# Patient Record
Sex: FEMALE | Race: WHITE | HISPANIC OR LATINO | URBAN - METROPOLITAN AREA
[De-identification: names, ages, dates, MRNs, and addresses within clinical notes are randomized per-mention and may not be internally consistent; named-entity substitution may affect disease eponyms.]

---

## 2019-04-03 ENCOUNTER — EMERGENCY (EMERGENCY)
Facility: HOSPITAL | Age: 29
LOS: 1 days | Discharge: HOME | End: 2019-04-03
Attending: EMERGENCY MEDICINE | Admitting: EMERGENCY MEDICINE
Payer: MEDICAID

## 2019-04-03 VITALS
DIASTOLIC BLOOD PRESSURE: 78 MMHG | SYSTOLIC BLOOD PRESSURE: 131 MMHG | TEMPERATURE: 98 F | OXYGEN SATURATION: 99 % | HEART RATE: 94 BPM | RESPIRATION RATE: 16 BRPM

## 2019-04-03 PROCEDURE — 99283 EMERGENCY DEPT VISIT LOW MDM: CPT

## 2019-04-03 PROCEDURE — 71046 X-RAY EXAM CHEST 2 VIEWS: CPT | Mod: 26

## 2019-04-03 RX ORDER — AZITHROMYCIN 500 MG/1
1 TABLET, FILM COATED ORAL
Qty: 6 | Refills: 0 | OUTPATIENT
Start: 2019-04-03 | End: 2019-04-07

## 2019-04-03 RX ADMIN — Medication 200 MILLIGRAM(S): at 12:11

## 2019-04-03 NOTE — ED PROVIDER NOTE - NSFOLLOWUPINSTRUCTIONS_ED_ALL_ED_FT
-- Follow up with your primary care physician in 48 hours.    -- Return to the ER for worsening or persistent symptoms, and/or ANY NEW OR CONCERNING SYMPTOMS.    -- If you have difficulty following up, please call: [phone] to obtain a [facility] doctor or specialist who takes your insurance in your area.

## 2019-04-03 NOTE — ED PROVIDER NOTE - NSFOLLOWUPCLINICS_GEN_ALL_ED_FT
[facility] General Internal Medicine  General Internal Medicine  [address] [id number]  Phone: [phone]  Fax:   Follow Up Time:

## 2019-04-03 NOTE — ED ADULT TRIAGE NOTE - CHIEF COMPLAINT QUOTE
pt complaining of cough x3 weeks and abdominal pain with diarrhea. Pt denies chest pain, SOB, fever or chills.

## 2019-04-03 NOTE — ED PROVIDER NOTE - OBJECTIVE STATEMENT
pt reports  cough for 2 weeks, subjective fevers, no SOB, no n/v/d, abdominal pain only when coughing.

## 2021-01-08 ENCOUNTER — EMERGENCY (EMERGENCY)
Facility: HOSPITAL | Age: 31
LOS: 1 days | Discharge: HOME | End: 2021-01-08
Attending: STUDENT IN AN ORGANIZED HEALTH CARE EDUCATION/TRAINING PROGRAM
Payer: MEDICAID

## 2021-01-08 VITALS
DIASTOLIC BLOOD PRESSURE: 80 MMHG | HEIGHT: 55 IN | RESPIRATION RATE: 18 BRPM | HEART RATE: 74 BPM | SYSTOLIC BLOOD PRESSURE: 114 MMHG | WEIGHT: 145.06 LBS | OXYGEN SATURATION: 99 % | TEMPERATURE: 98 F

## 2021-01-08 VITALS
DIASTOLIC BLOOD PRESSURE: 86 MMHG | HEART RATE: 78 BPM | TEMPERATURE: 99 F | SYSTOLIC BLOOD PRESSURE: 102 MMHG | OXYGEN SATURATION: 100 % | RESPIRATION RATE: 19 BRPM

## 2021-01-08 LAB
ALBUMIN SERPL ELPH-MCNC: 4.5 G/DL — SIGNIFICANT CHANGE UP (ref 3.3–5)
ALP SERPL-CCNC: 76 U/L — SIGNIFICANT CHANGE UP (ref 40–120)
ALT FLD-CCNC: 58 U/L — HIGH (ref 10–45)
ANION GAP SERPL CALC-SCNC: 12 MMOL/L — SIGNIFICANT CHANGE UP (ref 5–17)
AST SERPL-CCNC: 43 U/L — HIGH (ref 10–40)
BASOPHILS # BLD AUTO: 0.02 K/UL — SIGNIFICANT CHANGE UP (ref 0–0.2)
BASOPHILS NFR BLD AUTO: 0.3 % — SIGNIFICANT CHANGE UP (ref 0–2)
BILIRUB SERPL-MCNC: 0.2 MG/DL — SIGNIFICANT CHANGE UP (ref 0.2–1.2)
BLD GP AB SCN SERPL QL: NEGATIVE — SIGNIFICANT CHANGE UP
BUN SERPL-MCNC: 13 MG/DL — SIGNIFICANT CHANGE UP (ref 7–23)
CALCIUM SERPL-MCNC: 9.8 MG/DL — SIGNIFICANT CHANGE UP (ref 8.4–10.5)
CHLORIDE SERPL-SCNC: 103 MMOL/L — SIGNIFICANT CHANGE UP (ref 96–108)
CO2 SERPL-SCNC: 25 MMOL/L — SIGNIFICANT CHANGE UP (ref 22–31)
CREAT SERPL-MCNC: 0.77 MG/DL — SIGNIFICANT CHANGE UP (ref 0.5–1.3)
EOSINOPHIL # BLD AUTO: 0.07 K/UL — SIGNIFICANT CHANGE UP (ref 0–0.5)
EOSINOPHIL NFR BLD AUTO: 0.9 % — SIGNIFICANT CHANGE UP (ref 0–6)
GLUCOSE SERPL-MCNC: 100 MG/DL — HIGH (ref 70–99)
HCG SERPL-ACNC: 15 MIU/ML — HIGH
HCT VFR BLD CALC: 40.2 % — SIGNIFICANT CHANGE UP (ref 34.5–45)
HGB BLD-MCNC: 12.8 G/DL — SIGNIFICANT CHANGE UP (ref 11.5–15.5)
IMM GRANULOCYTES NFR BLD AUTO: 0.4 % — SIGNIFICANT CHANGE UP (ref 0–1.5)
LYMPHOCYTES # BLD AUTO: 2.08 K/UL — SIGNIFICANT CHANGE UP (ref 1–3.3)
LYMPHOCYTES # BLD AUTO: 26.6 % — SIGNIFICANT CHANGE UP (ref 13–44)
MCHC RBC-ENTMCNC: 28.3 PG — SIGNIFICANT CHANGE UP (ref 27–34)
MCHC RBC-ENTMCNC: 31.8 GM/DL — LOW (ref 32–36)
MCV RBC AUTO: 88.9 FL — SIGNIFICANT CHANGE UP (ref 80–100)
MONOCYTES # BLD AUTO: 0.54 K/UL — SIGNIFICANT CHANGE UP (ref 0–0.9)
MONOCYTES NFR BLD AUTO: 6.9 % — SIGNIFICANT CHANGE UP (ref 2–14)
NEUTROPHILS # BLD AUTO: 5.09 K/UL — SIGNIFICANT CHANGE UP (ref 1.8–7.4)
NEUTROPHILS NFR BLD AUTO: 64.9 % — SIGNIFICANT CHANGE UP (ref 43–77)
NRBC # BLD: 0 /100 WBCS — SIGNIFICANT CHANGE UP (ref 0–0)
PLATELET # BLD AUTO: 333 K/UL — SIGNIFICANT CHANGE UP (ref 150–400)
POTASSIUM SERPL-MCNC: 3.5 MMOL/L — SIGNIFICANT CHANGE UP (ref 3.5–5.3)
POTASSIUM SERPL-SCNC: 3.5 MMOL/L — SIGNIFICANT CHANGE UP (ref 3.5–5.3)
PROT SERPL-MCNC: 7.8 G/DL — SIGNIFICANT CHANGE UP (ref 6–8.3)
RBC # BLD: 4.52 M/UL — SIGNIFICANT CHANGE UP (ref 3.8–5.2)
RBC # FLD: 13.4 % — SIGNIFICANT CHANGE UP (ref 10.3–14.5)
RH IG SCN BLD-IMP: POSITIVE — SIGNIFICANT CHANGE UP
SODIUM SERPL-SCNC: 140 MMOL/L — SIGNIFICANT CHANGE UP (ref 135–145)
WBC # BLD: 7.83 K/UL — SIGNIFICANT CHANGE UP (ref 3.8–10.5)
WBC # FLD AUTO: 7.83 K/UL — SIGNIFICANT CHANGE UP (ref 3.8–10.5)

## 2021-01-08 PROCEDURE — 76817 TRANSVAGINAL US OBSTETRIC: CPT | Mod: 26

## 2021-01-08 PROCEDURE — 93975 VASCULAR STUDY: CPT | Mod: 26

## 2021-01-08 PROCEDURE — 99285 EMERGENCY DEPT VISIT HI MDM: CPT

## 2021-01-08 PROCEDURE — 99283 EMERGENCY DEPT VISIT LOW MDM: CPT

## 2021-01-08 NOTE — ED PROVIDER NOTE - PROGRESS NOTE DETAILS
Noman Sandoval PGY3  TVUS shows echogenic structure in adnexa, obgyn consulted, state hcg has actually been decreasing since at OSH, likely passed pregnancy, rec f/u in clinic in one week. clinically stable for dc

## 2021-01-08 NOTE — ED PROVIDER NOTE - CLINICAL SUMMARY MEDICAL DECISION MAKING FREE TEXT BOX
31 y/o female with no pmhx presenting with recent positive pregnancy test, vaginal bleeding, and abdominal pain. Will repeat Hcg and transvaginal u/s for low suspicion of threatened  vs. ectopic pregnancy. CBC to monitor H/H and type/screen if patient requires transfusion. UA for urinary symptoms

## 2021-01-08 NOTE — ED ADULT NURSE NOTE - OBJECTIVE STATEMENT
30yr old female  presents to ED c/o vaginal bleeding. Pt states she was seen at [facility] yesterday for abd pain and vaginal bleeding, she was told she was pregnant, discharged and told to f/u for rpt blood work. Pt states the abd pain and vaginal bleeding is not subsiding and she states she is going through 2 pads a day, and she is experiencing abd cramping in the MID/LLQ. Pts LMP was , pt denies CP, SOB, GI,  symptoms, fevers, chills, syncope, or weakness. Pt assessed by MD bed lowered and locked, call bell within reach. will reassess

## 2021-01-08 NOTE — ED PROVIDER NOTE - NS ED ROS FT
Gen: Denies fever, weight loss  CV: Denies chest pain, palpitations  Skin: Denies rash, erythema, color changes  Resp: Denies SOB, cough  Endo: Denies sensitivity to heat, cold, increased urination  GI: Denies diarrhea, constipation  Msk: Denies back pain, LE swelling, extremity pain  : Denies increased frequency  Neuro: Denies LOC, weakness, numbness/tingling

## 2021-01-08 NOTE — ED PROVIDER NOTE - NSFOLLOWUPINSTRUCTIONS_ED_ALL_ED_FT
Activities as tolerated. Please encourage good oral and fluid intake. For pain, please take Motrin 400mg every 4 hours as needed, or Tylenol 650mg every 6 hours as needed.    Please see your primary care doctor within 24-48 hours for further management of your symptoms.  Please follow up with obgyn [phone] in one week for further evaluation of your symptoms.    Please seek emergent medical management if you have any worsening signs or symptoms, such as worsening vaginal bleeding (>2 pads per hour), loss of consciousness.

## 2021-01-08 NOTE — ED ADULT NURSE NOTE - CHIEF COMPLAINT QUOTE
Pt c/o abdominal pain and vaginal bleeding for 6 days LMP 11/18,2020; was in [facility] twice for the same problem

## 2021-01-08 NOTE — CONSULT NOTE ADULT - ATTENDING COMMENTS
ATTG note    Pt seen with and agree with PGY2 note    29 yo P2 LMP 11/18 presents to [facility] ED after presenting to [place] ED twice this week for vaginal bleeding and lower abd pain.  Pt reports that pain was increasing today.  Pt reports that pain and bleeding started 6 days ago.  Seen at [facility] ED on 1/5 HCG was 114, then rpt HCG on 1/7 was 29.7.  Pt also had u.s there that showed no IUP.      VSS, afebrile  Gen-NAD  ABd-vertical midline scar, soft, nd, nt, no rebound, no guarding  -as above    Labs -   Hct-40.2  HCG-15  MBT-rh+  TVS-no IUP, thin EML, no conclusive adnexal masses, no free fluid    a/p-29 yo P2 presents to [facility] for mngt of vaginal bleeding and pain in first trimester.  HCG at outside hospital was trending down.  Today HCG is still trending down one day later.  No active bleeding on exam,.  VSS.  No acute abdomen.  No conclusive signs of adnexal masses on u/s.  With down trending HCG more than likely missed AB.  No further acute GYN intervention needed at this time but recommend to cont to trend HCG to negative.  -dc fro GYN standpoint  -f/u in 1 week for rpt HCG  -ED precautions - heavy bleeding or worsening pain    VAUGHN White

## 2021-01-08 NOTE — ED PROVIDER NOTE - GENITOURINARY SPECULUM EXAM
Gross blood in vaginal vault with no fissures/llacertions seen; unable to visualize cervical os due to blood in vaginal vault. No adnexal/cervical motion tenderness on bimanual exam

## 2021-01-08 NOTE — ED PROVIDER NOTE - PHYSICAL EXAMINATION
Gen: WDWN, NAD  HEENT: EOMI, no nasal discharge, mucous membranes moist  CV: RRR, +S1/S2, no M/R/G  Resp: CTAB, no W/R/R  GI: Abdomen soft non-distended, Mild TTP in suprapubic region with no rebound/guarding, no masses  MSK: No open wounds, no bruising, no LE edema, no calf tenderness   Neuro: CN2-12 grossly intact, A&Ox4, MS +5/5 in UE and LE BL, gross sensation intact in UE and LE BL  Psych: appropriate mood

## 2021-01-08 NOTE — ED PROVIDER NOTE - ATTENDING CONTRIBUTION TO CARE
30 F  (2 prior c section) LMP was , reports that she was told she was pregnant, and the first time she was at [facility] yesterday and was told that she should return to the hospital for repeat blood work,   pt is going through 2 ppd, no weakness, no syncope intermittent headache, that comes and goes, no associated vision changes, takes tylenol which helps w/ symptoms,   intermittent nausea/vomiting, no blood in the emesis.   on exam, pt is nontoxic appearing is speaking in full sentances,     this is her 30 F  (2 prior c section) LMP was , reports that she was told she was pregnant, and the first time she was at [facility] yesterday and was told that she should return to the hospital for repeat blood work,   pt is going through 2 ppd, no weakness, no syncope intermittent headache, that comes and goes, no associated vision changes, takes tylenol which helps w/ symptoms,   intermittent nausea/vomiting, no blood in the emesis.   on exam, pt is nontoxic appearing is speaking in full sentances,   abd soft, nontender, pelvic exam w/ mild blood no active pooling, no poc visualized, os closed  ddx threatened miscarriage, complete miscarriage, ectopic, pt w/ tvus w/ ?lesion in the RLQ, will have outpt follow up w/ obgyn was seen by gyn in er states ok to follow up outpt. pt is normotensive, nontoxic appearing , ambulatory no worsening symptoms.

## 2021-01-08 NOTE — ED PROVIDER NOTE - OBJECTIVE STATEMENT
29 y/o female with no pmhx presenting with recent positive pregnancy test, vaginal bleeding, and abdominal pain. Patient seen at [facility] on Tuesday and Wednesday with positive pregnancy test and u/s performed but patient does not know results. Patient's LMP on 11/18/20. Patient has had persistent vaginal bleeding since Sunday, using 2 pads per day. Abdominal pain is localized to b/l lower quadrants with intermittent emesis. Patient also reports burning with urination but denies fevers/chills, cough, diarrhea, or rashes. 31 y/o female with no pmhx presenting with recent positive pregnancy test, vaginal bleeding, and abdominal pain. Patient seen at [facility] on Tuesday and Wednesday with positive pregnancy test and u/s performed but patient does not know results. Patient's LMP on 20. Patient has had persistent vaginal bleeding since , using 2 pads per day. Abdominal pain is localized to b/l lower quadrants with intermittent emesis. Patient also reports burning with urination but denies fevers/chills, cough, diarrhea, or rashes. Patient has history of 2 full term  pregnancies with no complications and denies any hx of /miscarriages. Patient denies any weakness, syncope/LOC, lightheadedness, weakness, or numbness/tingling.

## 2021-01-08 NOTE — CONSULT NOTE ADULT - ASSESSMENT
29yo  LMP  with HCG 15 and no IUP or adnexal mass concerning for ectopic pregnancy on TVUS, thin endometrial stripe.  HCG is downtrending from values at [facility] earlier this week.  Minimal tenderness on abdominal exam and light bleed from cervical os.  Like miscarriage.    Recs:  -discharge patient with recommendation for 1wk follow up either at [facility], ED or Clinic  -patient may call clinic at [phone] on Monday to set appointment for next week  -return precautions provided to patient  -no acute OB/GYN intervention at this time    Pt seen and examined w/ Dr. Christopher Villegas, PGY2 29yo  LMP  with HCG 15 and no IUP or adnexal mass concerning for ectopic pregnancy on TVUS, thin endometrial stripe.  HCG is downtrending from values at [facility] earlier this week consistent with missed .  Minimal tenderness on abdominal exam and light bleed from cervical os.  Patient hemodynamically stable.    Recs:  -discharge patient with recommendation for 1wk follow HCG up either at [facility], ED or Clinic  -patient may call clinic at [phone] on Monday to set appointment for next week  -return precautions provided to patient  -no acute OB/GYN intervention at this time    Pt seen and examined w/ Dr. Christopher Villegas, PGY2

## 2021-01-08 NOTE — CONSULT NOTE ADULT - SUBJECTIVE AND OBJECTIVE BOX
Patient seen at bedside  Consult Note to follow GYN Consult Note    30y  LMP  w/ bleeding and abdominal cramping since .  Patient states she was seen in [facility] ED on Tuesday and Thursday this week forsymptoms but     : 114  : 29.7  HPI:      OB/GYN HISTORY:   G1:   G2:     Last Menstrual Period    Name of GYN Physician:  Date of Last Pap:  History of Abnormal Pap:  Date of Last Mammogram:  Date of Last Colonoscopy:  Current OCP use:     PAST MEDICAL & SURGICAL HISTORY:      REVIEW OF SYSTEMS  General: denies fevers, chills, tiredness  Skin/Breast: denies breast pain  Respiratory and Thorax: denies shortness of breath, denies cough  Cardiovascular: denies chest pain and denies palpitations  Gastrointestinal: denies abdominal pain, nausea/ vomiting	  Genitourinary: denies dysuria, increased urinary frequency, urgency	  Constitutional, Cardiovascular, Respiratory, Gastrointestinal, Genitourinary, Musculoskeletal and Integumentary review of systems are normal except as noted. 	    MEDICATIONS  (STANDING):    MEDICATIONS  (PRN):      Allergies    No Known Allergies    Intolerances        SOCIAL HISTORY:    FAMILY HISTORY:      Vital Signs Last 24 Hrs  T(C): 37.3 (2021 23:54), Max: 37.4 (2021 20:15)  T(F): 99.2 (2021 23:54), Max: 99.3 (2021 20:15)  HR: 78 (2021 23:54) (74 - 79)  BP: 102/86 (2021 23:54) (102/86 - 125/92)  BP(mean): --  RR: 19 (2021 23:54) (18 - 19)  SpO2: 100% (2021 23:54) (98% - 100%)    PHYSICAL EXAM:   Gen: NAD, alert and oriented x 3  Cardiovascular: regular   Respiratory: breathing comfortably on RA  Abd: soft, non tender, non-distended  Pelvic: closed/long, no CMT, Uterus: normal size, non tender  Adnexa: non tender, no palpable masses  Extremities: NTBL  Skin: warm and well perfused      LABS:                        12.8   7.83  )-----------( 333      ( 2021 20:42 )             40.2     01-08    140  |  103  |  13  ----------------------------<  100<H>  3.5   |  25  |  0.77    Ca    9.8      2021 20:42    TPro  7.8  /  Alb  4.5  /  TBili  0.2  /  DBili  x   /  AST  43<H>  /  ALT  58<H>  /  AlkPhos  76  -          RADIOLOGY & ADDITIONAL STUDIES: INCOMPLETE NOTE  GYN Consult Note    30y  LMP  w/ bleeding and abdominal cramping since .  Patient states she was seen in [facility] ED on Tuesday and Thursday this week for symptoms but     : 114  : 29.7  HPI:      OB/GYN HISTORY:   G1:   G2:     Last Menstrual Period    Name of GYN Physician:  Date of Last Pap:  History of Abnormal Pap:  Date of Last Mammogram:  Date of Last Colonoscopy:  Current OCP use:     PAST MEDICAL & SURGICAL HISTORY:      REVIEW OF SYSTEMS  General: denies fevers, chills, tiredness  Skin/Breast: denies breast pain  Respiratory and Thorax: denies shortness of breath, denies cough  Cardiovascular: denies chest pain and denies palpitations  Gastrointestinal: denies abdominal pain, nausea/ vomiting	  Genitourinary: denies dysuria, increased urinary frequency, urgency	  Constitutional, Cardiovascular, Respiratory, Gastrointestinal, Genitourinary, Musculoskeletal and Integumentary review of systems are normal except as noted. 	    MEDICATIONS  (STANDING):    MEDICATIONS  (PRN):      Allergies    No Known Allergies    Intolerances        SOCIAL HISTORY:    FAMILY HISTORY:      Vital Signs Last 24 Hrs  T(C): 37.3 (2021 23:54), Max: 37.4 (2021 20:15)  T(F): 99.2 (2021 23:54), Max: 99.3 (2021 20:15)  HR: 78 (2021 23:54) (74 - 79)  BP: 102/86 (2021 23:54) (102/86 - 125/92)  BP(mean): --  RR: 19 (2021 23:54) (18 - 19)  SpO2: 100% (2021 23:54) (98% - 100%)    PHYSICAL EXAM:   Gen: NAD, alert and oriented x 3  Cardiovascular: regular   Respiratory: breathing comfortably on RA  Abd: soft, non tender, non-distended  Pelvic: closed/long, no CMT, minimal bleeding from cervical OS Uterus: normal size, non tender  Adnexa: non tender, no palpable masses  Extremities: NTBL  Skin: warm and well perfused      LABS:                        12.8   7.83  )-----------( 333      ( 2021 20:42 )             40.2     01-08    140  |  103  |  13  ----------------------------<  100<H>  3.5   |  25  |  0.77    Ca    9.8      2021 20:42    TPro  7.8  /  Alb  4.5  /  TBili  0.2  /  DBili  x   /  AST  43<H>  /  ALT  58<H>  /  AlkPhos  76            RADIOLOGY & ADDITIONAL STUDIES: GYN Consult Note    30y  @7w3d by LMP  w/ bleeding and abdominal cramping since .  Patient states she was seen in [facility] ED on Tuesday and Thursday this week for symptoms but was unhappy with care she received.  She states the bleeding has slowed since , now only 2 pads a day.  Cramping is significantly better and managed with Tylenol  Denies nausea, vomiting, fevers, chills, SOB, headache.  This is a desired pregnancy test.  Upon examination we called [facility] and were given following HCG values:  114 () -> 29.7 ()      OB/GYN HISTORY:   G1: pLTCS  transverse lie  G2: rLTCS          PAST MEDICAL & SURGICAL HISTORY:   section x2    REVIEW OF SYSTEMS  General: denies fevers, chills, tiredness  Skin/Breast: denies breast pain  Respiratory and Thorax: denies shortness of breath, denies cough  Cardiovascular: denies chest pain and denies palpitations  Gastrointestinal: denies nausea/ vomiting	  Genitourinary: denies dysuria, increased urinary frequency, urgency	  Constitutional, Cardiovascular, Respiratory, Gastrointestinal, Genitourinary, Musculoskeletal and Integumentary review of systems are normal except as noted. 	    MEDICATIONS  (STANDING):      Allergies  No Known Allergies        Vital Signs Last 24 Hrs  T(C): 37.3 (2021 23:54), Max: 37.4 (2021 20:15)  T(F): 99.2 (2021 23:54), Max: 99.3 (2021 20:15)  HR: 78 (2021 23:54) (74 - 79)  BP: 102/86 (2021 23:54) (102/86 - 125/92)  BP(mean): --  RR: 19 (2021 23:54) (18 - 19)  SpO2: 100% (2021 23:54) (98% - 100%)    PHYSICAL EXAM:   Gen: NAD, alert and oriented x 3  Cardiovascular: regular   Respiratory: breathing comfortably on RA  Abd: soft, non tender, non-distended  Pelvic: closed/long, no CMT, minimal bleeding from cervical OS Uterus: normal size, non tender  Adnexa: non tender, no palpable masses  Extremities: NTBL  Skin: warm and well perfused      LABS:                        12.8   7.83  )-----------( 333      ( 2021 20:42 )             40.2         140  |  103  |  13  ----------------------------<  100<H>  3.5   |  25  |  0.77    Ca    9.8      2021 20:42    TPro  7.8  /  Alb  4.5  /  TBili  0.2  /  DBili  x   /  AST  43<H>  /  ALT  58<H>  /  AlkPhos  76        hHCG Quantitative, Serum (21 @ 20:42)   HCG Quantitative, Serum: 15.0    RADIOLOGY & ADDITIONAL STUDIES:  < from: US Doppler Pelvis (21 @ 21:51) >    EXAM:  US OB TRANSVAGINAL                          EXAM:  US DPLX PELVIC                            PROCEDURE DATE:  2021            INTERPRETATION:  CLINICAL INFORMATION: Positive pregnancy test at outside hospital. Vaginal bleeding for 6 days. Evaluate for ectopic pregnancy.    LMP: 2020  Beta-hCG: 15.0    Estimated Gestational Age by LMP: 7 weeks 2 days.    COMPARISON: None available.    TECHNIQUE: Endovaginal pelvic sonogram only. Color and Spectral Doppler was performed.    FINDINGS:    Uterus: No intrauterine gestation.  Endometrium: 10 mm.    Right ovary: 2.3 x 1.2 x 1.4 cm. Normal arterial and venous waveforms.    In the region of the right adnexa, there is a echogenic structure measuring 1.1 x 0.8 x 1.0 cm that appears separate from the ovary. Complete evaluation was limited secondary to overlying bowel gas.    Left ovary: 2.6 x 1.6 x 1.7 cm. Within normal limits. Normal arterial and venous waveforms.    Fluid: None.    IMPRESSION:    No intrauterine pregnancy. Questionable complex right adnexal structure, not well evaluated due to to extensive overlying bowel gas. Patient's last name beta-hCG is 15, indeterminate for pregnancy.    Recommend trending serial beta-hCG with repeat ultrasound in one week if patient remains hemodynamically stable.      < end of copied text >

## 2021-01-08 NOTE — ED PROVIDER NOTE - PATIENT PORTAL LINK FT
You can access the FollowMyHealth Patient Portal offered by [facility] by registering at the following website: [email or web address]. By joining [facility]’s FollowMyHealth portal, you will also be able to view your health information using other applications (apps) compatible with our system.

## 2021-01-09 LAB
APPEARANCE UR: CLEAR — SIGNIFICANT CHANGE UP
BACTERIA # UR AUTO: NEGATIVE — SIGNIFICANT CHANGE UP
BILIRUB UR-MCNC: NEGATIVE — SIGNIFICANT CHANGE UP
COLOR SPEC: SIGNIFICANT CHANGE UP
DIFF PNL FLD: ABNORMAL
EPI CELLS # UR: 6 /HPF — HIGH
GLUCOSE UR QL: NEGATIVE — SIGNIFICANT CHANGE UP
HYALINE CASTS # UR AUTO: 1 /LPF — SIGNIFICANT CHANGE UP (ref 0–2)
KETONES UR-MCNC: NEGATIVE — SIGNIFICANT CHANGE UP
LEUKOCYTE ESTERASE UR-ACNC: NEGATIVE — SIGNIFICANT CHANGE UP
NITRITE UR-MCNC: NEGATIVE — SIGNIFICANT CHANGE UP
PH UR: 6.5 — SIGNIFICANT CHANGE UP (ref 5–8)
PROT UR-MCNC: ABNORMAL
RBC CASTS # UR COMP ASSIST: 166 /HPF — HIGH (ref 0–4)
SP GR SPEC: 1.02 — SIGNIFICANT CHANGE UP (ref 1.01–1.02)
UROBILINOGEN FLD QL: NEGATIVE — SIGNIFICANT CHANGE UP
WBC UR QL: 5 /HPF — SIGNIFICANT CHANGE UP (ref 0–5)

## 2021-01-09 PROCEDURE — 93975 VASCULAR STUDY: CPT

## 2021-01-09 PROCEDURE — 86900 BLOOD TYPING SEROLOGIC ABO: CPT

## 2021-01-09 PROCEDURE — 84702 CHORIONIC GONADOTROPIN TEST: CPT

## 2021-01-09 PROCEDURE — 86901 BLOOD TYPING SEROLOGIC RH(D): CPT

## 2021-01-09 PROCEDURE — 76817 TRANSVAGINAL US OBSTETRIC: CPT

## 2021-01-09 PROCEDURE — 81001 URINALYSIS AUTO W/SCOPE: CPT

## 2021-01-09 PROCEDURE — 85025 COMPLETE CBC W/AUTO DIFF WBC: CPT

## 2021-01-09 PROCEDURE — 86850 RBC ANTIBODY SCREEN: CPT

## 2021-01-09 PROCEDURE — 99284 EMERGENCY DEPT VISIT MOD MDM: CPT

## 2021-01-09 PROCEDURE — 80053 COMPREHEN METABOLIC PANEL: CPT

## 2021-01-14 PROBLEM — Z78.9 OTHER SPECIFIED HEALTH STATUS: Chronic | Status: ACTIVE | Noted: 2019-04-03

## 2021-01-21 NOTE — CHART NOTE - NSCHARTNOTEFT_GEN_A_CORE
Pt called to remind to come to ED for repeat hcg level- no answer.    Diana Teague PGY2
Patient seen in [facility] ED on 1/8 with PUL. Called patient to remind her to come to [facility] ED for follow up HCG level. Patient verbalizes understanding, will come to ED tomorrow.     Margarita Burnett MD PGY1  d/w Dr. Alejandro
Patient unable to be contacted via telephone on multiple attempte. Certified letter sent (#[phone]) with information for OBGYN clinic to make appointment to follow up for repeat beta hcg testing. Will follow up and confirm receipt of letter.     Margarita Burnett MD PGY1

## 2021-04-08 ENCOUNTER — EMERGENCY (EMERGENCY)
Facility: HOSPITAL | Age: 31
LOS: 1 days | Discharge: HOME | End: 2021-04-08
Attending: EMERGENCY MEDICINE
Payer: MEDICAID

## 2021-04-08 VITALS
TEMPERATURE: 98 F | SYSTOLIC BLOOD PRESSURE: 110 MMHG | WEIGHT: 145.06 LBS | RESPIRATION RATE: 18 BRPM | HEART RATE: 81 BPM | OXYGEN SATURATION: 97 % | HEIGHT: 55 IN | DIASTOLIC BLOOD PRESSURE: 76 MMHG

## 2021-04-08 VITALS
DIASTOLIC BLOOD PRESSURE: 74 MMHG | HEART RATE: 70 BPM | OXYGEN SATURATION: 100 % | SYSTOLIC BLOOD PRESSURE: 111 MMHG | TEMPERATURE: 99 F | RESPIRATION RATE: 17 BRPM

## 2021-04-08 LAB
ALBUMIN SERPL ELPH-MCNC: 4.2 G/DL — SIGNIFICANT CHANGE UP (ref 3.3–5)
ALP SERPL-CCNC: 73 U/L — SIGNIFICANT CHANGE UP (ref 40–120)
ALT FLD-CCNC: 57 U/L — HIGH (ref 10–45)
ANION GAP SERPL CALC-SCNC: 12 MMOL/L — SIGNIFICANT CHANGE UP (ref 5–17)
APPEARANCE UR: ABNORMAL
AST SERPL-CCNC: 38 U/L — SIGNIFICANT CHANGE UP (ref 10–40)
BACTERIA # UR AUTO: ABNORMAL
BASOPHILS # BLD AUTO: 0.03 K/UL — SIGNIFICANT CHANGE UP (ref 0–0.2)
BASOPHILS NFR BLD AUTO: 0.5 % — SIGNIFICANT CHANGE UP (ref 0–2)
BILIRUB SERPL-MCNC: 0.4 MG/DL — SIGNIFICANT CHANGE UP (ref 0.2–1.2)
BILIRUB UR-MCNC: NEGATIVE — SIGNIFICANT CHANGE UP
BLD GP AB SCN SERPL QL: NEGATIVE — SIGNIFICANT CHANGE UP
BUN SERPL-MCNC: 12 MG/DL — SIGNIFICANT CHANGE UP (ref 7–23)
CALCIUM SERPL-MCNC: 9.3 MG/DL — SIGNIFICANT CHANGE UP (ref 8.4–10.5)
CHLORIDE SERPL-SCNC: 104 MMOL/L — SIGNIFICANT CHANGE UP (ref 96–108)
CO2 SERPL-SCNC: 23 MMOL/L — SIGNIFICANT CHANGE UP (ref 22–31)
COLOR SPEC: YELLOW — SIGNIFICANT CHANGE UP
CREAT SERPL-MCNC: 0.64 MG/DL — SIGNIFICANT CHANGE UP (ref 0.5–1.3)
DIFF PNL FLD: ABNORMAL
EOSINOPHIL # BLD AUTO: 0.09 K/UL — SIGNIFICANT CHANGE UP (ref 0–0.5)
EOSINOPHIL NFR BLD AUTO: 1.4 % — SIGNIFICANT CHANGE UP (ref 0–6)
EPI CELLS # UR: 5 /HPF — SIGNIFICANT CHANGE UP
GLUCOSE SERPL-MCNC: 89 MG/DL — SIGNIFICANT CHANGE UP (ref 70–99)
GLUCOSE UR QL: NEGATIVE — SIGNIFICANT CHANGE UP
HCG SERPL-ACNC: 21 MIU/ML — HIGH
HCT VFR BLD CALC: 40.9 % — SIGNIFICANT CHANGE UP (ref 34.5–45)
HGB BLD-MCNC: 12.9 G/DL — SIGNIFICANT CHANGE UP (ref 11.5–15.5)
HYALINE CASTS # UR AUTO: 0 /LPF — SIGNIFICANT CHANGE UP (ref 0–7)
IMM GRANULOCYTES NFR BLD AUTO: 0.3 % — SIGNIFICANT CHANGE UP (ref 0–1.5)
INR BLD: 1.12 RATIO — SIGNIFICANT CHANGE UP (ref 0.88–1.16)
KETONES UR-MCNC: NEGATIVE — SIGNIFICANT CHANGE UP
LEUKOCYTE ESTERASE UR-ACNC: ABNORMAL
LYMPHOCYTES # BLD AUTO: 1.92 K/UL — SIGNIFICANT CHANGE UP (ref 1–3.3)
LYMPHOCYTES # BLD AUTO: 29 % — SIGNIFICANT CHANGE UP (ref 13–44)
MCHC RBC-ENTMCNC: 28 PG — SIGNIFICANT CHANGE UP (ref 27–34)
MCHC RBC-ENTMCNC: 31.5 GM/DL — LOW (ref 32–36)
MCV RBC AUTO: 88.9 FL — SIGNIFICANT CHANGE UP (ref 80–100)
MONOCYTES # BLD AUTO: 0.56 K/UL — SIGNIFICANT CHANGE UP (ref 0–0.9)
MONOCYTES NFR BLD AUTO: 8.5 % — SIGNIFICANT CHANGE UP (ref 2–14)
NEUTROPHILS # BLD AUTO: 3.99 K/UL — SIGNIFICANT CHANGE UP (ref 1.8–7.4)
NEUTROPHILS NFR BLD AUTO: 60.3 % — SIGNIFICANT CHANGE UP (ref 43–77)
NITRITE UR-MCNC: POSITIVE
NRBC # BLD: 0 /100 WBCS — SIGNIFICANT CHANGE UP (ref 0–0)
PH UR: 6.5 — SIGNIFICANT CHANGE UP (ref 5–8)
PLATELET # BLD AUTO: 334 K/UL — SIGNIFICANT CHANGE UP (ref 150–400)
POTASSIUM SERPL-MCNC: 4 MMOL/L — SIGNIFICANT CHANGE UP (ref 3.5–5.3)
POTASSIUM SERPL-SCNC: 4 MMOL/L — SIGNIFICANT CHANGE UP (ref 3.5–5.3)
PROT SERPL-MCNC: 7.3 G/DL — SIGNIFICANT CHANGE UP (ref 6–8.3)
PROT UR-MCNC: ABNORMAL
PROTHROM AB SERPL-ACNC: 13.4 SEC — SIGNIFICANT CHANGE UP (ref 10.6–13.6)
RBC # BLD: 4.6 M/UL — SIGNIFICANT CHANGE UP (ref 3.8–5.2)
RBC # FLD: 13.1 % — SIGNIFICANT CHANGE UP (ref 10.3–14.5)
RBC CASTS # UR COMP ASSIST: 143 /HPF — HIGH (ref 0–4)
RH IG SCN BLD-IMP: POSITIVE — SIGNIFICANT CHANGE UP
SARS-COV-2 RNA SPEC QL NAA+PROBE: SIGNIFICANT CHANGE UP
SODIUM SERPL-SCNC: 139 MMOL/L — SIGNIFICANT CHANGE UP (ref 135–145)
SP GR SPEC: 1.02 — SIGNIFICANT CHANGE UP (ref 1.01–1.02)
UROBILINOGEN FLD QL: NEGATIVE — SIGNIFICANT CHANGE UP
WBC # BLD: 6.61 K/UL — SIGNIFICANT CHANGE UP (ref 3.8–10.5)
WBC # FLD AUTO: 6.61 K/UL — SIGNIFICANT CHANGE UP (ref 3.8–10.5)
WBC UR QL: 7 /HPF — HIGH (ref 0–5)

## 2021-04-08 PROCEDURE — 99283 EMERGENCY DEPT VISIT LOW MDM: CPT

## 2021-04-08 PROCEDURE — 99285 EMERGENCY DEPT VISIT HI MDM: CPT

## 2021-04-08 PROCEDURE — 76817 TRANSVAGINAL US OBSTETRIC: CPT | Mod: 26

## 2021-04-08 PROCEDURE — 93975 VASCULAR STUDY: CPT | Mod: 26

## 2021-04-08 PROCEDURE — 93010 ELECTROCARDIOGRAM REPORT: CPT

## 2021-04-08 RX ORDER — CEPHALEXIN 500 MG
500 CAPSULE ORAL ONCE
Refills: 0 | Status: COMPLETED | OUTPATIENT
Start: 2021-04-08 | End: 2021-04-08

## 2021-04-08 RX ORDER — CEPHALEXIN 500 MG
1 CAPSULE ORAL
Qty: 20 | Refills: 0
Start: 2021-04-08 | End: 2021-04-17

## 2021-04-08 RX ORDER — CEPHALEXIN 500 MG
1 CAPSULE ORAL
Qty: 14 | Refills: 0
Start: 2021-04-08 | End: 2021-04-14

## 2021-04-08 RX ADMIN — Medication 500 MILLIGRAM(S): at 21:15

## 2021-04-08 NOTE — ED PROVIDER NOTE - PROGRESS NOTE DETAILS
no adnexal ttp- no cmt - os closed -- pocus no ff no  iup no adnexal masses -- ucg neg - will wait for [facility], [place] pt signed out to me by paresh. pending labs, if bhcg + obgyn c/s for pregnancy unknown location. pt evaluated by obgyn. they request radiology tvus. will order. per obgyn plan for d/c with outpatient f/u for beta check. - Sammy Thayer MD

## 2021-04-08 NOTE — CONSULT NOTE ADULT - ATTENDING COMMENTS
Patient presents with bleeding and cramping \  Quant is 21  RH positive  Patient most likely with SAB  She will have a repeat in 24 hours to make sure there is no rise.   It may take a longer time to fall to zero

## 2021-04-08 NOTE — CONSULT NOTE ADULT - SUBJECTIVE AND OBJECTIVE BOX
R1 GYN ED CONSULT NOTE    SUBJECTIVE:    31yo  ,LMP , who presents with vaginal bleeding x 2 days and mild abdominal cramping x2 weeks. Pt states that two weeks ago she had a positive home pregnancy test that was confirmed at a clinic in [facility]. Pt states that the VB has improved from 3 pads/day to 2 pads/day today. Pt denies passage of tissue. Craping is also significatly better and improves with tylenol and motrin. This is a desired pregnancy    Pt denies fever, chills, nausea, vomiting, or dizziness. Pt also states that she has been experiencing dysuria and increased urinary frequency x2 weeks.     In ED today, beta hcg was 21.    Primary OB/GYN:  OBH: C/Sx2 (, -breech), SAB 2019  GYNH: denies hx of fibroids, ov cysts, abnl paps, sti  PMSH: c/s x2  MEDS: none  ALL: nkda  ROS: negative except per hpi    OBJECTIVE:    VITAL SIGNS:  Vital Signs Last 24 Hrs  T(C): 36.7 (2021 16:06), Max: 36.8 (2021 12:58)  T(F): 98 (2021 16:06), Max: 98.2 (2021 12:58)  HR: 66 (2021 16:06) (66 - 81)  BP: 111/75 (2021 16:06) (107/55 - 111/75)  BP(mean): --  RR: 16 (2021 16:06) (16 - 18)  SpO2: 100% (2021 16:06) (97% - 100%)  Height (cm): 134.6 (21 @ 12:58)  Weight (kg): 65.8 (21 @ 12:58)  BMI (kg/m2): 36.3 (21 @ 12:58)  BSA (m2): 1.49 (21 @ 12:58)  CAPILLARY BLOOD GLUCOSE            PHYSICAL EXAM:  GEN: NAD, A&Ox3  CV: RRR, no m/g/r  LUNGS: CTAB  ABD: soft, NT, ND, +BS  SPECULUM: 10cc of dark red blood in vaginal vault. OS closed.   EXT: WWP, no edema/TTP    LABS:                        12.9   6.61  )-----------( 334      ( 2021 15:59 )             40.9   baso 0.5    eos 1.4    imm gran 0.3    lymph 29.0   mono 8.5    poly 60.3           139  |  104  |  12  ----------------------------<  89  4.0   |  23  |  0.64    Ca    9.3      2021 15:59    TPro  7.3  /  Alb  4.2  /  TBili  0.4  /  DBili  x   /  AST  38  /  ALT  57<H>  /  AlkPhos  73  08      Urinalysis Basic - ( 2021 16:22 )    Color: Yellow / Appearance: Slightly Turbid / S.025 / pH: x  Gluc: x / Ketone: Negative  / Bili: Negative / Urobili: Negative   Blood: x / Protein: 30 mg/dL / Nitrite: Positive   Leuk Esterase: Small / RBC: 143 /hpf / WBC 7 /HPF   Sq Epi: x / Non Sq Epi: 5 /hpf / Bacteria: Many        RADIOLOGY:

## 2021-04-08 NOTE — ED PROVIDER NOTE - RESPIRATORY NEGATIVE STATEMENT, MLM
Transition of care: DUANE to mid to distal LAD and mid to distal left circumflex on 12/22/20. Met with pt in room. Pt lives alone in a 2 story home. Independent with ADLs and drives. Not a . DME- crutches and a nebulizer. Voiced no needs for home. His sister will provide transportation home. Pt said his [address] yr old daughter is coming to stay with him after discharge. PCP is Dr. Michele Wills and pharmacy is [facility] in [place]. Discharge order on chart.
no chest pain, no cough, and no shortness of breath.

## 2021-04-08 NOTE — ED PROVIDER NOTE - NSFOLLOWUPCLINICS_GEN_ALL_ED_FT
[facility] Gynecology and Obstetrics  Gynceology/OB  [address] [place] [address]  Phone: [phone]  Fax:   Follow Up Time: Urgent

## 2021-04-08 NOTE — ED ADULT NURSE NOTE - OBJECTIVE STATEMENT
31 y/o Female no significant pmh  presenting to ED c/o lower generalized abd pain and discomfort associated with vaginal bleeding that started yesterday with unknown pregnancy status. pt states her LMP was in feb 2021 and she has had 3 negative and 2 positive pregnancy tests over the course of 3 weeks with vaginal bleeding starting yesterday. denies any nausea, vomiting, diarrhea, fever, chills, known sick contacts or recent travel. VS stable. labs and line obtained, pending sono. safety maintained. call bell at the bedside and within reach.

## 2021-04-08 NOTE — ED PROVIDER NOTE - PATIENT PORTAL LINK FT
You can access the FollowMyHealth Patient Portal offered by [facility] by registering at the following website: [email or web address]. By joining [employer]’s FollowMyHealth portal, you will also be able to view your health information using other applications (apps) compatible with our system.

## 2021-04-08 NOTE — ED PROVIDER NOTE - CLINICAL SUMMARY MEDICAL DECISION MAKING FREE TEXT BOX
paresh - 30 f  with miscarriage in late january 2 menses in february last  then bleeding started 2 weeks ago with cramping urine preg pos at home neg in clincic -- abd soft hemodynamically stable - chk hcg, pelvic us r/o ectopic and reeval

## 2021-04-08 NOTE — ED PROVIDER NOTE - OBJECTIVE STATEMENT
29 yo F, , presenting with lower abdominal pain and vaginal bleeding for two weeks. States that her last normal period ended on  and since then she had a positive pregnancy test at home and had a negative one in the clinic. She also notes three days of dysuria, white vaginal discharge and vaginal itching. O/w no fevers, chills, ches

## 2021-04-08 NOTE — ED PROVIDER NOTE - EYES NEGATIVE STATEMENT, MLM
KARO Plan: 
  
* Disposition- [facility] 
* EMTALA required * Patient now on IV Vanc * weekly labs * ID/ Neuro/PCP F/U * Post rehab [facility] for home IV ABX * address Code status *  notes patient is a [employer] Discharge is anticipated for Friday as patient's IV ABX changed from gent and ceftriaxone to Vanc. [facility] has accepted and is following. [facility] is on \"will Call\". Karon Foster, [address] no discharge, no irritation, no pain, no redness, and no visual changes.

## 2021-04-08 NOTE — CONSULT NOTE ADULT - ASSESSMENT
A/P:   30y  , LMP ,  presents with c/o vaginal bleeding and abdominal cramping  found to have a b-HCG of 21. TVUS showed no intrauterine pregnancy. No tenderness on abdominal exam and light bleed from cervical os. Patient hemodynamically stable.   Differential includes threatened AB vs. ectopic pregnancy vs. viable IUP vs. spontanous  in progress.  Difficult to assess at this time but low suspicion for ectopic pregnancy. Likely SAB.   -obtain a TVUS before discharge  -patient to follow up in 48 hours for repeat b-HCG in the ER  -Rh type: neg. No need for rhogam at this time.   -Ectopic precautions reviewed with patient.  Discussed with patient importance of follow up for b-HCG given unknown pregnancy location at this time.  Patient expressed understanding.  All questions and concerns addressed to patient's apparent satisfaction.   -patient to be added to GYN b-HCG list for closer follow up.    - treat UTI  -patient stable for d/c home from GYN perspective.  Primary managment per ED team.      d/w  Dr. Lanny Beltran PGY1

## 2021-04-08 NOTE — ED PROVIDER NOTE - PHYSICAL EXAMINATION
paresh -aaox3 nad  ncat  s1s2 rrr  ctabl, s1s2 rrr  abd soft nt nd  no cmt no adnexal ttp   no cvat  no rash  cn2-12 intact

## 2021-04-08 NOTE — ED PROVIDER NOTE - NSFOLLOWUPINSTRUCTIONS_ED_ALL_ED_FT
You were seen in the Emergency Department for vaginal bleeding. It was determined that you may have had a spontaneous , as no pregnancy was visualized on imaging but your hormone level was consistent with pregnancy. Please follow up with OB in 48 hours as directed for repeat hormone testing. If you do not hear from them tomorrow please return to the ED for another test.     Additionally, you were found to have evidence of a urinary tract infection, please continue to take your prescribed antibiotic twice a day for the next 7 days.      1) Advance activity as tolerated.   2) Continue all previously prescribed medications as directed.    3) Follow up with your primary care physician in 24-48 hours - take copies of your results.    4) Return to the Emergency Department for worsening or persistent symptoms, and/or ANY NEW OR CONCERNING SYMPTOMS.

## 2021-04-09 LAB
C TRACH RRNA SPEC QL NAA+PROBE: SIGNIFICANT CHANGE UP
N GONORRHOEA RRNA SPEC QL NAA+PROBE: SIGNIFICANT CHANGE UP
SPECIMEN SOURCE: SIGNIFICANT CHANGE UP

## 2021-04-10 ENCOUNTER — EMERGENCY (EMERGENCY)
Facility: HOSPITAL | Age: 31
LOS: 1 days | Discharge: HOME | End: 2021-04-10
Attending: EMERGENCY MEDICINE
Payer: MEDICAID

## 2021-04-10 VITALS
RESPIRATION RATE: 19 BRPM | SYSTOLIC BLOOD PRESSURE: 106 MMHG | OXYGEN SATURATION: 100 % | DIASTOLIC BLOOD PRESSURE: 54 MMHG | HEART RATE: 78 BPM | TEMPERATURE: 99 F

## 2021-04-10 VITALS
WEIGHT: 145.06 LBS | HEART RATE: 89 BPM | RESPIRATION RATE: 18 BRPM | OXYGEN SATURATION: 98 % | HEIGHT: 55 IN | SYSTOLIC BLOOD PRESSURE: 128 MMHG | DIASTOLIC BLOOD PRESSURE: 70 MMHG | TEMPERATURE: 98 F

## 2021-04-10 LAB
-  AMIKACIN: SIGNIFICANT CHANGE UP
-  AMOXICILLIN/CLAVULANIC ACID: SIGNIFICANT CHANGE UP
-  AMPICILLIN/SULBACTAM: SIGNIFICANT CHANGE UP
-  AMPICILLIN: SIGNIFICANT CHANGE UP
-  AZTREONAM: SIGNIFICANT CHANGE UP
-  CEFAZOLIN: SIGNIFICANT CHANGE UP
-  CEFEPIME: SIGNIFICANT CHANGE UP
-  CEFOXITIN: SIGNIFICANT CHANGE UP
-  CEFTRIAXONE: SIGNIFICANT CHANGE UP
-  CIPROFLOXACIN: SIGNIFICANT CHANGE UP
-  ERTAPENEM: SIGNIFICANT CHANGE UP
-  GENTAMICIN: SIGNIFICANT CHANGE UP
-  IMIPENEM: SIGNIFICANT CHANGE UP
-  LEVOFLOXACIN: SIGNIFICANT CHANGE UP
-  MEROPENEM: SIGNIFICANT CHANGE UP
-  NITROFURANTOIN: SIGNIFICANT CHANGE UP
-  PIPERACILLIN/TAZOBACTAM: SIGNIFICANT CHANGE UP
-  TIGECYCLINE: SIGNIFICANT CHANGE UP
-  TOBRAMYCIN: SIGNIFICANT CHANGE UP
-  TRIMETHOPRIM/SULFAMETHOXAZOLE: SIGNIFICANT CHANGE UP
ALBUMIN SERPL ELPH-MCNC: 4 G/DL — SIGNIFICANT CHANGE UP (ref 3.3–5)
ALP SERPL-CCNC: 68 U/L — SIGNIFICANT CHANGE UP (ref 40–120)
ALT FLD-CCNC: 51 U/L — HIGH (ref 10–45)
ANION GAP SERPL CALC-SCNC: 8 MMOL/L — SIGNIFICANT CHANGE UP (ref 5–17)
AST SERPL-CCNC: 37 U/L — SIGNIFICANT CHANGE UP (ref 10–40)
BASOPHILS # BLD AUTO: 0.03 K/UL — SIGNIFICANT CHANGE UP (ref 0–0.2)
BASOPHILS NFR BLD AUTO: 0.4 % — SIGNIFICANT CHANGE UP (ref 0–2)
BILIRUB SERPL-MCNC: 0.2 MG/DL — SIGNIFICANT CHANGE UP (ref 0.2–1.2)
BLD GP AB SCN SERPL QL: NEGATIVE — SIGNIFICANT CHANGE UP
BUN SERPL-MCNC: 14 MG/DL — SIGNIFICANT CHANGE UP (ref 7–23)
CALCIUM SERPL-MCNC: 8.8 MG/DL — SIGNIFICANT CHANGE UP (ref 8.4–10.5)
CHLORIDE SERPL-SCNC: 106 MMOL/L — SIGNIFICANT CHANGE UP (ref 96–108)
CO2 SERPL-SCNC: 24 MMOL/L — SIGNIFICANT CHANGE UP (ref 22–31)
CREAT SERPL-MCNC: 0.81 MG/DL — SIGNIFICANT CHANGE UP (ref 0.5–1.3)
CULTURE RESULTS: SIGNIFICANT CHANGE UP
EOSINOPHIL # BLD AUTO: 0.06 K/UL — SIGNIFICANT CHANGE UP (ref 0–0.5)
EOSINOPHIL NFR BLD AUTO: 0.8 % — SIGNIFICANT CHANGE UP (ref 0–6)
GLUCOSE SERPL-MCNC: 95 MG/DL — SIGNIFICANT CHANGE UP (ref 70–99)
HCG SERPL-ACNC: 23.6 MIU/ML — HIGH
HCT VFR BLD CALC: 37.6 % — SIGNIFICANT CHANGE UP (ref 34.5–45)
HGB BLD-MCNC: 11.9 G/DL — SIGNIFICANT CHANGE UP (ref 11.5–15.5)
IMM GRANULOCYTES NFR BLD AUTO: 0.1 % — SIGNIFICANT CHANGE UP (ref 0–1.5)
LYMPHOCYTES # BLD AUTO: 1.91 K/UL — SIGNIFICANT CHANGE UP (ref 1–3.3)
LYMPHOCYTES # BLD AUTO: 25.7 % — SIGNIFICANT CHANGE UP (ref 13–44)
MCHC RBC-ENTMCNC: 28 PG — SIGNIFICANT CHANGE UP (ref 27–34)
MCHC RBC-ENTMCNC: 31.6 GM/DL — LOW (ref 32–36)
MCV RBC AUTO: 88.5 FL — SIGNIFICANT CHANGE UP (ref 80–100)
METHOD TYPE: SIGNIFICANT CHANGE UP
MONOCYTES # BLD AUTO: 0.59 K/UL — SIGNIFICANT CHANGE UP (ref 0–0.9)
MONOCYTES NFR BLD AUTO: 7.9 % — SIGNIFICANT CHANGE UP (ref 2–14)
NEUTROPHILS # BLD AUTO: 4.83 K/UL — SIGNIFICANT CHANGE UP (ref 1.8–7.4)
NEUTROPHILS NFR BLD AUTO: 65.1 % — SIGNIFICANT CHANGE UP (ref 43–77)
NRBC # BLD: 0 /100 WBCS — SIGNIFICANT CHANGE UP (ref 0–0)
ORGANISM # SPEC MICROSCOPIC CNT: SIGNIFICANT CHANGE UP
ORGANISM # SPEC MICROSCOPIC CNT: SIGNIFICANT CHANGE UP
PLATELET # BLD AUTO: 310 K/UL — SIGNIFICANT CHANGE UP (ref 150–400)
POTASSIUM SERPL-MCNC: 4.4 MMOL/L — SIGNIFICANT CHANGE UP (ref 3.5–5.3)
POTASSIUM SERPL-SCNC: 4.4 MMOL/L — SIGNIFICANT CHANGE UP (ref 3.5–5.3)
PROT SERPL-MCNC: 6.7 G/DL — SIGNIFICANT CHANGE UP (ref 6–8.3)
RBC # BLD: 4.25 M/UL — SIGNIFICANT CHANGE UP (ref 3.8–5.2)
RBC # FLD: 13 % — SIGNIFICANT CHANGE UP (ref 10.3–14.5)
RH IG SCN BLD-IMP: POSITIVE — SIGNIFICANT CHANGE UP
SODIUM SERPL-SCNC: 138 MMOL/L — SIGNIFICANT CHANGE UP (ref 135–145)
SPECIMEN SOURCE: SIGNIFICANT CHANGE UP
WBC # BLD: 7.43 K/UL — SIGNIFICANT CHANGE UP (ref 3.8–10.5)
WBC # FLD AUTO: 7.43 K/UL — SIGNIFICANT CHANGE UP (ref 3.8–10.5)

## 2021-04-10 PROCEDURE — 81001 URINALYSIS AUTO W/SCOPE: CPT

## 2021-04-10 PROCEDURE — 85610 PROTHROMBIN TIME: CPT

## 2021-04-10 PROCEDURE — 76817 TRANSVAGINAL US OBSTETRIC: CPT

## 2021-04-10 PROCEDURE — 36000 PLACE NEEDLE IN VEIN: CPT

## 2021-04-10 PROCEDURE — 84702 CHORIONIC GONADOTROPIN TEST: CPT

## 2021-04-10 PROCEDURE — 86850 RBC ANTIBODY SCREEN: CPT

## 2021-04-10 PROCEDURE — 87086 URINE CULTURE/COLONY COUNT: CPT

## 2021-04-10 PROCEDURE — 87491 CHLMYD TRACH DNA AMP PROBE: CPT

## 2021-04-10 PROCEDURE — 86901 BLOOD TYPING SEROLOGIC RH(D): CPT

## 2021-04-10 PROCEDURE — 86900 BLOOD TYPING SEROLOGIC ABO: CPT

## 2021-04-10 PROCEDURE — 85025 COMPLETE CBC W/AUTO DIFF WBC: CPT

## 2021-04-10 PROCEDURE — 87591 N.GONORRHOEAE DNA AMP PROB: CPT

## 2021-04-10 PROCEDURE — 80053 COMPREHEN METABOLIC PANEL: CPT

## 2021-04-10 PROCEDURE — 87186 SC STD MICRODIL/AGAR DIL: CPT

## 2021-04-10 PROCEDURE — 99285 EMERGENCY DEPT VISIT HI MDM: CPT

## 2021-04-10 PROCEDURE — 99284 EMERGENCY DEPT VISIT MOD MDM: CPT | Mod: 25

## 2021-04-10 PROCEDURE — 93975 VASCULAR STUDY: CPT

## 2021-04-10 PROCEDURE — 93975 VASCULAR STUDY: CPT | Mod: 26

## 2021-04-10 PROCEDURE — U0003: CPT

## 2021-04-10 PROCEDURE — 76817 TRANSVAGINAL US OBSTETRIC: CPT | Mod: 26

## 2021-04-10 PROCEDURE — U0005: CPT

## 2021-04-10 PROCEDURE — 93005 ELECTROCARDIOGRAM TRACING: CPT

## 2021-04-10 RX ORDER — ACETAMINOPHEN 500 MG
975 TABLET ORAL ONCE
Refills: 0 | Status: COMPLETED | OUTPATIENT
Start: 2021-04-10 | End: 2021-04-10

## 2021-04-10 RX ORDER — SODIUM CHLORIDE 9 MG/ML
1000 INJECTION INTRAMUSCULAR; INTRAVENOUS; SUBCUTANEOUS ONCE
Refills: 0 | Status: COMPLETED | OUTPATIENT
Start: 2021-04-10 | End: 2021-04-10

## 2021-04-10 RX ADMIN — SODIUM CHLORIDE 1000 MILLILITER(S): 9 INJECTION INTRAMUSCULAR; INTRAVENOUS; SUBCUTANEOUS at 16:22

## 2021-04-10 RX ADMIN — Medication 975 MILLIGRAM(S): at 17:00

## 2021-04-10 NOTE — ED PROVIDER NOTE - LANGUAGE ASSISTANCE NEEDED
Pt understands and speaks english but prefers Kyrgyz./Yes-Patient/Caregiver accepts free interpretation services...

## 2021-04-10 NOTE — ED PROVIDER NOTE - OBJECTIVE STATEMENT
PT here for repeat Beta. Was seen here 2 days ago. LMP 2/28/21, presented 2 days ago with vaginal bleeding and pelvic pain. At that time had no IUP seen on US, CG = 21. Seen by GYN in ED and told to f/u today for repeat. Was dc on Keflex for UTI. Pt notes no longer having bleeding today. Still with some mild pelvic discomfort. No fevers/nausea/vomiting/dizziness. No new complaints

## 2021-04-10 NOTE — ED ADULT NURSE NOTE - LANGUAGE ASSISTANCE NEEDED
Pt understands and speaks english but prefers Turkmen./Yes-Patient/Caregiver accepts free interpretation services...

## 2021-04-10 NOTE — ED PROVIDER NOTE - CLINICAL SUMMARY MEDICAL DECISION MAKING FREE TEXT BOX
Arabella Tovar MD - Attending Physician: PT here with low HCG, no IUP on US 2 days ago, here for Beta recheck/US recheck. Notes improved symptoms, no longer with vag bleeding and only small amount of pelvic pain. VSS, well appearing, benign exam. Labs, US for eval

## 2021-04-10 NOTE — ED PROVIDER NOTE - PMH
No pertinent past medical history    
balance training/bed mobility training/gait training/strengthening/transfer training

## 2021-04-10 NOTE — ED ADULT NURSE NOTE - OBJECTIVE STATEMENT
30y female  6 weeks pregnant presents to the ER c/o vaginal bleeding. pt is alert and oriented x 4 and speaking coherently. PT LMP 2021, pt states she was here  and was told she did not have an IUP and had a concern for ectopic pregnancy. PT was dc home and told to come to ER for follow up HCG. Pt states she has not had any vaginal bleeding today. Pt c/o 8/10 lower abdominal pain, with intermittent sob. Pt respirations non labored at this time. VSS. Patient well appearing, MD shaikh completed. will reassess.

## 2021-04-10 NOTE — CONSULT NOTE ADULT - ATTENDING COMMENTS
Patient is a 30 yr  presents to ED for a follow up HCG secondary to pregnancy of unknown location.   Patient previously had HCG of 21 and today is 23.   Ultrasound completed, no IUP seen, and small adnexal lesion seen   On exam, abdominal exam benign with no rebound, no guarding non tender.   This pregnancy is highly desired. Low suspicion for ectopic pregnancy. No evidence of acute abdomen. At this time this is a pregnancy of uknonw location.   Will repeat HCG in 2 days per protocol.  Strict return precautions reviewed with patient including pain and bleeding precautions     Seen and evaluated with resident Dr. Dayne Sibley MD

## 2021-04-10 NOTE — CONSULT NOTE ADULT - ASSESSMENT
management pending final read of ultrasound  30y  , LMP , presenting for follow up of pregnancy of unknown location, HCG trend 21()->23(4/10).TVUS  today again c/w no IUP, finding of 1-1.5cm adnexal structure (on right vs left).  No tenderness on abdominal exam. Patient hemodynamically stable.    Differential includes threatened AB vs. ectopic pregnancy vs. viable IUP vs. spontanous  in progress.  Difficult to assess at this time but low suspicion for ectopic pregnancy. Likely SAB.   - Final read of TVUS pending  - Rh +, no rhogam  - Return precautions provided   - Return to ED in TWO DAYS for repeat HCG  - Pt to be followed closely on GYN beta list    Pt seen with Dr. Sibley, CHRISTINEC Attending   Singed out from Joy PGY2  Dayne PGY2 30y  , LMP , presenting for follow up of pregnancy of unknown location, HCG trend 21()->23(4/10).TVUS  today again c/w no IUP, finding of 1-1.5cm adnexal structure (on right vs left).  No tenderness on abdominal exam. Patient hemodynamically stable.    Differential includes threatened AB vs. ectopic pregnancy vs. viable IUP vs. spontanous  in progress.  Difficult to assess at this time but low suspicion for ectopic pregnancy. Likely SAB.   - Final read of TVUS pending  - Rh +, no rhogam  - Return precautions provided   - Return to ED in TWO DAYS for repeat HCG  - Pt to be followed closely on GYN beta list    Pt seen with Dr. Sibley, CHRISTINEC Attending   Singed out from Joy PGY2  Dayne PGY2

## 2021-04-10 NOTE — ED ADULT TRIAGE NOTE - LANGUAGE ASSISTANCE NEEDED
Pt understands and speaks english but prefers Tajik./Yes-Patient/Caregiver accepts free interpretation services...

## 2021-04-10 NOTE — CONSULT NOTE ADULT - SUBJECTIVE AND OBJECTIVE BOX
R2 GYNECOLOGY CONSULT NOTE    HPI    31yo  LMP  presented to ED on  with c/o vaginal bleeding and abdominal cramping. Pt was previously with pregnancy of unknown location on 21 with bHCG 15 and US without IUP, complex right adnexal structure. She was lost to follow-up until presented to ED on  with vaginal bleeding. At that time, pt reported using 2-3 pads per day. b-HCG was 21 and TVUS showed no IUP, 1 cm echogenic structure in the left adnexa, no FF. She was instructed to return 4/10 for repeat bHCG.     Today, patient reports     OB/GYN HISTORY:   PAST MEDICAL & SURGICAL HISTORY:  No pertinent past medical history    No significant past surgical history      Allergies    No Known Allergies    Intolerances      MEDICATIONS  (STANDING):    MEDICATIONS  (PRN):    FAMILY HISTORY:    SOCIAL HISTORY:  REVIEW OF SYSTEMS:    CONSTITUTIONAL: No weakness, fevers or chills  EYES/ENT: No visual changes;  No vertigo or throat pain   NECK: No pain or stiffness  RESPIRATORY: No cough, wheezing, hemoptysis; No shortness of breath  CARDIOVASCULAR: No chest pain or palpitations  GASTROINTESTINAL: No abdominal or epigastric pain. No nausea, vomiting, or hematemesis; No diarrhea or constipation. No melena or hematochezia.  GENITOURINARY: No dysuria, frequency or hematuria  NEUROLOGICAL: No numbness or weakness  SKIN: No itching, burning, rashes, or lesions   All other review of systems is negative unless indicated above.    Name of GYN Physician:  Date of Last Pap:  History of Abnormal Pap:  Date of Last Mammogram:  Date of Last Colonoscopy:    Vital Signs Last 24 Hrs  T(C): 36.8 (10 Apr 2021 16:16), Max: 36.8 (10 Apr 2021 16:16)  T(F): 98.3 (10 Apr 2021 16:16), Max: 98.3 (10 Apr 2021 16:16)  HR: 63 (10 Apr 2021 17:07) (63 - 89)  BP: 109/76 (10 Apr 2021 17:07) (97/72 - 128/70)  BP(mean): --  RR: 18 (10 Apr 2021 17:07) (18 - 19)  SpO2: 99% (10 Apr 2021 17:07) (98% - 100%)    PHYSICAL EXAM:      Constitutional: alert and oriented x 3    Breasts: no tenderness, no nodules    Respiratory: clear    Cardiovascular: regular rate and rhythm    Gastrointestinal: soft, non tender, + bowel sounds. No hepatosplenomegaly, no palpable masses    Genitourinary: NEFG  Cervix: closed/ long, no CMT  Uterus: normal size, non tender  Adnexa: non tender, no palpable masses    Rectal:     Extremities:    Neurological:    Skin:    Lymph Nodes:        LABS:                        11.9   7.43  )-----------( 310      ( 10 Apr 2021 14:09 )             37.6     04-10    138  |  106  |  14  ----------------------------<  95  4.4   |  24  |  0.81    Ca    8.8      10 Apr 2021 14:09    TPro  6.7  /  Alb  4.0  /  TBili  0.2  /  DBili  x   /  AST  37  /  ALT  51<H>  /  AlkPhos  68  04-10          Blood Type: B Positive      RADIOLOGY & ADDITIONAL STUDIES:           R2 GYNECOLOGY CONSULT NOTE    HPI    29yo P2 LMP 2/28 presented to ED on 4/8 with c/o vaginal bleeding and abdominal cramping. Pt was previously with pregnancy of unknown location on 1/8/21 with bHCG 15 and US without IUP, complex right adnexal structure. She was lost to follow-up until presented to ED on 4/8 with vaginal bleeding and abdominal pain/cramping in setting of +UPT at [facility]. At that time, pt reported using 2-3 pads per day. b-HCG was 21 and TVUS showed no IUP, 1 cm echogenic structure in the left adnexa, no FF. She was instructed to return 4/10 for repeat bHCG.     Today, patient reports that vaginal bleeding has improved and has required 1 pad today. Continues to report occasional abdominal cramping discomfort that does not radiate. Denies nausea/vomiting, CP, SOB, palpitations. She reiterates previous episode of vaginal bleeding in January and attributed that to a miscarriage. She reports having 2x menses in February: 2/5-6/21 and 2/27-28/21. Denies menses in March.     OB/GYN HISTORY:   G1 2005 C/S malpresentation  G2 2010 repeat C/S  G3 ?Jan 2021 SAB? vs present  Denies fibroids, ov cysts, STIs, abnl Pap smears      PAST MEDICAL & SURGICAL HISTORY:    No pertinent past medical history    No significant past surgical history      Allergies    No Known Allergies    Intolerances      MEDICATIONS  (STANDING):    MEDICATIONS  (PRN):    FAMILY HISTORY:    SOCIAL HISTORY:  REVIEW OF SYSTEMS:    CONSTITUTIONAL: No weakness, fevers or chills  EYES/ENT: No visual changes;  No vertigo or throat pain   NECK: No pain or stiffness  RESPIRATORY: No cough, wheezing, hemoptysis; No shortness of breath  CARDIOVASCULAR: No chest pain or palpitations  GASTROINTESTINAL: No abdominal or epigastric pain. No nausea, vomiting, or hematemesis; No diarrhea or constipation. No melena or hematochezia.  GENITOURINARY: No dysuria, frequency or hematuria  NEUROLOGICAL: No numbness or weakness  SKIN: No itching, burning, rashes, or lesions   All other review of systems is negative unless indicated above.    Name of GYN Physician:  Date of Last Pap:  History of Abnormal Pap:  Date of Last Mammogram:  Date of Last Colonoscopy:    Vital Signs Last 24 Hrs  T(C): 36.8 (10 Apr 2021 16:16), Max: 36.8 (10 Apr 2021 16:16)  T(F): 98.3 (10 Apr 2021 16:16), Max: 98.3 (10 Apr 2021 16:16)  HR: 63 (10 Apr 2021 17:07) (63 - 89)  BP: 109/76 (10 Apr 2021 17:07) (97/72 - 128/70)  BP(mean): --  RR: 18 (10 Apr 2021 17:07) (18 - 19)  SpO2: 99% (10 Apr 2021 17:07) (98% - 100%)    PHYSICAL EXAM:      Constitutional: alert and oriented x 3    Breasts: no tenderness, no nodules    Respiratory: clear    Cardiovascular: regular rate and rhythm    Gastrointestinal: soft, non tender, + bowel sounds. No hepatosplenomegaly, no palpable masses    Genitourinary: NEFG  Cervix: closed/ long, no CMT  Uterus: normal size, non tender  Adnexa: non tender, no palpable masses    Rectal:     Extremities:    Neurological:    Skin:    Lymph Nodes:        LABS:                        11.9   7.43  )-----------( 310      ( 10 Apr 2021 14:09 )             37.6     04-10    138  |  106  |  14  ----------------------------<  95  4.4   |  24  |  0.81    Ca    8.8      10 Apr 2021 14:09    TPro  6.7  /  Alb  4.0  /  TBili  0.2  /  DBili  x   /  AST  37  /  ALT  51<H>  /  AlkPhos  68  04-10          Blood Type: B Positive      RADIOLOGY & ADDITIONAL STUDIES:           R2 GYNECOLOGY CONSULT NOTE    HPI    31yo P2 LMP 2/28 presented to ED on 4/8 with c/o vaginal bleeding and abdominal cramping. Pt was previously with pregnancy of unknown location on 1/8/21 with bHCG 15 and US without IUP, complex right adnexal structure. She was lost to follow-up until presented to ED on 4/8 with vaginal bleeding and abdominal pain/cramping in setting of +UPT at [facility]. At that time, pt reported using 2-3 pads per day. b-HCG was 21 and TVUS showed no IUP, 1 cm echogenic structure in the left adnexa, no FF. She was instructed to return 4/10 for repeat bHCG.     Today, patient reports that vaginal bleeding has improved and has required 1 pad today. Continues to report occasional abdominal cramping discomfort that does not radiate. Denies nausea/vomiting, CP, SOB, palpitations. She reiterates previous episode of vaginal bleeding in January and attributed that to a miscarriage. She reports having 2x menses in February: 2/5-6/21 and 2/27-28/21. Denies menses in March.     OB/GYN HISTORY:   G1 2005 C/S malpresentation  G2 2010 repeat C/S  G3 ?Jan 2021 SAB? vs present  Denies fibroids, ov cysts, STIs, abnl Pap smears      PAST MEDICAL & SURGICAL HISTORY:    No pertinent past medical history    No significant past surgical history      Allergies    No Known Allergies    Intolerances      MEDICATIONS  (STANDING):    MEDICATIONS  (PRN):    FAMILY HISTORY:    SOCIAL HISTORY:  REVIEW OF SYSTEMS:    CONSTITUTIONAL: No weakness, fevers or chills  EYES/ENT: No visual changes;  No vertigo or throat pain   NECK: No pain or stiffness  RESPIRATORY: No cough, wheezing, hemoptysis; No shortness of breath  CARDIOVASCULAR: No chest pain or palpitations  GASTROINTESTINAL: No abdominal or epigastric pain. No nausea, vomiting, or hematemesis; No diarrhea or constipation. No melena or hematochezia.  GENITOURINARY: No dysuria, frequency or hematuria  NEUROLOGICAL: No numbness or weakness  SKIN: No itching, burning, rashes, or lesions   All other review of systems is negative unless indicated above.    Name of GYN Physician:  Date of Last Pap:  History of Abnormal Pap:  Date of Last Mammogram:  Date of Last Colonoscopy:    Vital Signs Last 24 Hrs  T(C): 36.8 (10 Apr 2021 16:16), Max: 36.8 (10 Apr 2021 16:16)  T(F): 98.3 (10 Apr 2021 16:16), Max: 98.3 (10 Apr 2021 16:16)  HR: 63 (10 Apr 2021 17:07) (63 - 89)  BP: 109/76 (10 Apr 2021 17:07) (97/72 - 128/70)  BP(mean): --  RR: 18 (10 Apr 2021 17:07) (18 - 19)  SpO2: 99% (10 Apr 2021 17:07) (98% - 100%)    PHYSICAL EXAM:      Constitutional: alert and oriented x 3    Breasts: no tenderness, no nodules    Respiratory: clear    Cardiovascular: regular rate and rhythm    Gastrointestinal: soft, non tender, + bowel sounds. No hepatosplenomegaly, no palpable masses    Genitourinary: NEFG  Cervix: closed/ long, no CMT  Uterus: normal size, non tender  Adnexa: non tender, no palpable masses    Rectal:     Extremities:    Neurological:    Skin:    Lymph Nodes:        LABS:                        11.9   7.43  )-----------( 310      ( 10 Apr 2021 14:09 )             37.6     04-10    138  |  106  |  14  ----------------------------<  95  4.4   |  24  |  0.81    Ca    8.8      10 Apr 2021 14:09    TPro  6.7  /  Alb  4.0  /  TBili  0.2  /  DBili  x   /  AST  37  /  ALT  51<H>  /  AlkPhos  68  04-10          Blood Type: B Positive      HCG Quantitative, Serum: 23.6: HCG-Quantitative test interpretations: This test is intended only for the  detection & monitoring of pregnancy. For oncology studies order the tumor  marker test “HCG-TM” (hCG-Tumor Marker).  For pregnancy evaluation the reference values are as follows:  Negative:  <=4 mIU/mL  Indeterminate:  5 - 25 mIU/mL (suggest repeat testing in 72 hours)  Positive:  > 25 mIU/mL  Reference Values during Pregnancy:  Weeks after LMP* REFERENCE INTERVAL mIU/mL     3      6 - 71     4      10 - 750     5      220 - 7,100     6      160 - 32,000     7      3,700 - 164,000     8      32,000 - 150,000     9      64,000 - 151,000     10      47,000 - 187,000     12      28,000 - 211,000     14      14,000 - 63,000     15      12,000 - 71,000     16 - 18  8,000 - 58,000  * LMP:  Last Menstrual Period  HCG results of false positive and false negative for pregnancy are rare,  but can occur with this, and other, hCG tests. Claribel- and post-menopausal  females may have mildly elevated hCG concentrations usually less than 14  mIU/mL that are constant over time. mIU/mL (04.10.21 @ 14:09)        RADIOLOGY & ADDITIONAL STUDIES:    < from: US Transvaginal, OB (04.10.21 @ 16:03) >    ******PRELIMINARY REPORT******    ******PRELIMINARY REPORT******          EXAM:  US OB TRANSVAGINAL                            PROCEDURE DATE:  04/10/2021      ******PRELIMINARY REPORT******    ******PRELIMINARY REPORT******              INTERPRETATION:  No intrauterine gestational is identified. A small isoechoic area measuring up to 1.5 cm lies adjacent to the right ovary (it is possibly part of the right ovary?). Differential consideration includes ectopic pregnancy. The previously seen echogenic structure adjacent to the left ovary on 4/8/2021 is no longer identified. Recommend continued serial hCG levels and pelvic ultrasound.    These findings were discussed with Dr. BAUM on 4/10/2021 at 4:30 PM by Dr. Lew with read back confirmation.            ******PRELIMINARY REPORT******    ******PRELIMINARY REPORT******          CRYSTAL LEW MD; Resident Radiology    < end of copied text >             R2 GYNECOLOGY CONSULT NOTE    HPI    31yo P2 LMP 2/28 presented to ED on 4/8 with c/o vaginal bleeding and abdominal cramping. Pt was previously with pregnancy of unknown location on 1/8/21 with bHCG 15 and US without IUP, complex right adnexal structure. She was lost to follow-up until presented to ED on 4/8 with vaginal bleeding and abdominal pain/cramping in setting of +UPT at [facility]. At that time, pt reported using 2-3 pads per day. b-HCG was 21 and TVUS showed no IUP, 1 cm echogenic structure in the left adnexa, no FF. She was instructed to return 4/10 for repeat bHCG.     Today, patient reports that vaginal bleeding has improved and has required 1 pad today. Continues to report occasional abdominal cramping discomfort that does not radiate. Denies nausea/vomiting, CP, SOB, palpitations. She reiterates previous episode of vaginal bleeding in January and attributed that to a miscarriage. She reports having 2x menses in February: 2/5-6/21 and 2/27-28/21. Denies menses in March.     OB/GYN HISTORY:   G1 2005 C/S malpresentation  G2 2010 repeat C/S  G3 ?Jan 2021 SAB? vs present  Denies fibroids, ov cysts, STIs, abnl Pap smears  OBGYN = [id number]    PMSH: fatty liver, C/S x2    Meds: MVI  All: NKDA    REVIEW OF SYSTEMS:  CONSTITUTIONAL: No weakness, fevers or chills  RESPIRATORY: No cough, wheezing, hemoptysis; No shortness of breath  CARDIOVASCULAR: No chest pain or palpitations  GASTROINTESTINAL: +mid abdominal pain; no nausea, vomiting.  GENITOURINARY: +vaginal bleeding; No dysuria, frequency or hematuria  All other review of systems is negative unless indicated above.    Vital Signs Last 24 Hrs  T(C): 36.8 (10 Apr 2021 16:16), Max: 36.8 (10 Apr 2021 16:16)  T(F): 98.3 (10 Apr 2021 16:16), Max: 98.3 (10 Apr 2021 16:16)  HR: 63 (10 Apr 2021 17:07) (63 - 89)  BP: 109/76 (10 Apr 2021 17:07) (97/72 - 128/70)  RR: 18 (10 Apr 2021 17:07) (18 - 19)  SpO2: 99% (10 Apr 2021 17:07) (98% - 100%)    PHYSICAL EXAM:  Gen: awake, alert, NAD, resting comfortably in bed  Chest: nonlabored breathing  Abd: soft, minimal TTP mid abdomen; no rebound/guarding  : NEFG  Speculum: scant blood in vault, no active bleeding from cervix  Bimanual: nontender adnexa b/l, nontender fundus, no CMT  Ext: nontender    LABS:                        11.9   7.43  )-----------( 310      ( 10 Apr 2021 14:09 )             37.6     04-10    138  |  106  |  14  ----------------------------<  95  4.4   |  24  |  0.81    Ca    8.8      10 Apr 2021 14:09    TPro  6.7  /  Alb  4.0  /  TBili  0.2  /  DBili  x   /  AST  37  /  ALT  51<H>  /  AlkPhos  68  04-10      Blood Type: B Positive      HCG Quantitative, Serum: 23.6 (04.10.21 @ 14:09)      RADIOLOGY & ADDITIONAL STUDIES:    < from: US Transvaginal, OB (04.10.21 @ 16:03) >    ******PRELIMINARY REPORT******    ******PRELIMINARY REPORT******          EXAM:  US OB TRANSVAGINAL                            PROCEDURE DATE:  04/10/2021      ******PRELIMINARY REPORT******    ******PRELIMINARY REPORT******              INTERPRETATION:  No intrauterine gestational is identified. A small isoechoic area measuring up to 1.5 cm lies adjacent to the right ovary (it is possibly part of the right ovary?). Differential consideration includes ectopic pregnancy. The previously seen echogenic structure adjacent to the left ovary on 4/8/2021 is no longer identified. Recommend continued serial hCG levels and pelvic ultrasound.    These findings were discussed with Dr. BAUM on 4/10/2021 at 4:30 PM by Dr. Lew with read back confirmation.            ******PRELIMINARY REPORT******    ******PRELIMINARY REPORT******          CRYSTAL LEW MD; Resident Radiology    < end of copied text >

## 2021-04-10 NOTE — CHART NOTE - NSCHARTNOTEFT_GEN_A_CORE
R2 GYN Telephone Note    29yo  LMP  presented to ED on  with c/o vaginal bleeding and abdominal cramping. Pt was previously with pregnancy of unknown location in January and was lost to follow-up. On presentation, b-HCG was 21 and TVUS showed no IUP, 1 cm echogenic structure in the left adnexa, no FF. She was instructed to return 4/10 for repeat bHCG.    Called patient to confirm plan to complete repeat bHCG check today.  Pt verbalized understanding and plans to present to ED soon.    Guerita Hamilton R2

## 2021-04-10 NOTE — ED PROVIDER NOTE - PROGRESS NOTE DETAILS
Arabella Tovar MD - Attending Physician: Labs nonactionable. HCG 23 from 21 - 2 days ago. US pending for Gyn consult Eugenie Garcia PGY-1: OB at bedside. Patient reporting mild abdominal pain. Will give Tylenol.

## 2021-04-10 NOTE — ED PROVIDER NOTE - PATIENT PORTAL LINK FT
You can access the FollowMyHealth Patient Portal offered by [facility] by registering at the following website: [email or web address]. By joining [employer]’s FollowMyHealth portal, you will also be able to view your health information using other applications (apps) compatible with our system.

## 2021-04-10 NOTE — ED ADULT NURSE REASSESSMENT NOTE - NS ED NURSE REASSESS COMMENT FT1
Report received from Sherice LICONA. Pt A&Ox4, currently d/c. Paperwork & d/c education provided to pt, pt demonstrated understanding using teach-back.

## 2021-04-10 NOTE — ED PROVIDER NOTE - NSFOLLOWUPINSTRUCTIONS_ED_ALL_ED_FT
Return in 2 days for repeat B-hcg and TV US as per ob/gyn for further evaluation  return sooner for worsening vaginal bleeding or abdominal pain.

## 2021-04-12 ENCOUNTER — APPOINTMENT (OUTPATIENT)
Dept: OBGYN | Facility: CLINIC | Age: 31
End: 2021-04-12

## 2021-04-12 ENCOUNTER — EMERGENCY (EMERGENCY)
Facility: HOSPITAL | Age: 31
LOS: 1 days | Discharge: HOME | End: 2021-04-12
Attending: EMERGENCY MEDICINE
Payer: MEDICAID

## 2021-04-12 VITALS
OXYGEN SATURATION: 99 % | HEART RATE: 77 BPM | HEIGHT: 55 IN | SYSTOLIC BLOOD PRESSURE: 123 MMHG | RESPIRATION RATE: 18 BRPM | TEMPERATURE: 98 F | DIASTOLIC BLOOD PRESSURE: 85 MMHG | WEIGHT: 145.06 LBS

## 2021-04-12 VITALS
RESPIRATION RATE: 18 BRPM | SYSTOLIC BLOOD PRESSURE: 107 MMHG | HEART RATE: 65 BPM | TEMPERATURE: 98 F | OXYGEN SATURATION: 100 % | DIASTOLIC BLOOD PRESSURE: 74 MMHG

## 2021-04-12 DIAGNOSIS — O36.80X0 PREGNANCY WITH INCONCLUSIVE FETAL VIABILITY, NOT APPLICABLE OR UNSPECIFIED: ICD-10-CM

## 2021-04-12 LAB
ANION GAP SERPL CALC-SCNC: 11 MMOL/L — SIGNIFICANT CHANGE UP (ref 5–17)
APPEARANCE UR: ABNORMAL
BACTERIA # UR AUTO: NEGATIVE — SIGNIFICANT CHANGE UP
BILIRUB UR-MCNC: NEGATIVE — SIGNIFICANT CHANGE UP
BUN SERPL-MCNC: 13 MG/DL — SIGNIFICANT CHANGE UP (ref 7–23)
CALCIUM SERPL-MCNC: 9.4 MG/DL — SIGNIFICANT CHANGE UP (ref 8.4–10.5)
CHLORIDE SERPL-SCNC: 104 MMOL/L — SIGNIFICANT CHANGE UP (ref 96–108)
CO2 SERPL-SCNC: 24 MMOL/L — SIGNIFICANT CHANGE UP (ref 22–31)
COLOR SPEC: YELLOW — SIGNIFICANT CHANGE UP
CREAT SERPL-MCNC: 0.61 MG/DL — SIGNIFICANT CHANGE UP (ref 0.5–1.3)
DIFF PNL FLD: ABNORMAL
EPI CELLS # UR: 10 /HPF — HIGH
GLUCOSE SERPL-MCNC: 96 MG/DL — SIGNIFICANT CHANGE UP (ref 70–99)
GLUCOSE UR QL: NEGATIVE — SIGNIFICANT CHANGE UP
HCG SERPL-ACNC: 32.1 MIU/ML — HIGH
HCG UR QL: POSITIVE
HCT VFR BLD CALC: 40.6 % — SIGNIFICANT CHANGE UP (ref 34.5–45)
HGB BLD-MCNC: 13.1 G/DL — SIGNIFICANT CHANGE UP (ref 11.5–15.5)
HYALINE CASTS # UR AUTO: 5 /LPF — HIGH (ref 0–2)
KETONES UR-MCNC: NEGATIVE — SIGNIFICANT CHANGE UP
LEUKOCYTE ESTERASE UR-ACNC: NEGATIVE — SIGNIFICANT CHANGE UP
MCHC RBC-ENTMCNC: 28.1 PG — SIGNIFICANT CHANGE UP (ref 27–34)
MCHC RBC-ENTMCNC: 32.3 GM/DL — SIGNIFICANT CHANGE UP (ref 32–36)
MCV RBC AUTO: 87.1 FL — SIGNIFICANT CHANGE UP (ref 80–100)
NITRITE UR-MCNC: NEGATIVE — SIGNIFICANT CHANGE UP
NRBC # BLD: 0 /100 WBCS — SIGNIFICANT CHANGE UP (ref 0–0)
PH UR: 6 — SIGNIFICANT CHANGE UP (ref 5–8)
PLATELET # BLD AUTO: 338 K/UL — SIGNIFICANT CHANGE UP (ref 150–400)
POTASSIUM SERPL-MCNC: 3.9 MMOL/L — SIGNIFICANT CHANGE UP (ref 3.5–5.3)
POTASSIUM SERPL-SCNC: 3.9 MMOL/L — SIGNIFICANT CHANGE UP (ref 3.5–5.3)
PROT UR-MCNC: ABNORMAL
RBC # BLD: 4.66 M/UL — SIGNIFICANT CHANGE UP (ref 3.8–5.2)
RBC # FLD: 13.1 % — SIGNIFICANT CHANGE UP (ref 10.3–14.5)
RBC CASTS # UR COMP ASSIST: 3 /HPF — SIGNIFICANT CHANGE UP (ref 0–4)
SODIUM SERPL-SCNC: 139 MMOL/L — SIGNIFICANT CHANGE UP (ref 135–145)
SP GR SPEC: 1.03 — HIGH (ref 1.01–1.02)
UROBILINOGEN FLD QL: NEGATIVE — SIGNIFICANT CHANGE UP
WBC # BLD: 7.17 K/UL — SIGNIFICANT CHANGE UP (ref 3.8–10.5)
WBC # FLD AUTO: 7.17 K/UL — SIGNIFICANT CHANGE UP (ref 3.8–10.5)
WBC UR QL: 2 /HPF — SIGNIFICANT CHANGE UP (ref 0–5)

## 2021-04-12 PROCEDURE — 81025 URINE PREGNANCY TEST: CPT

## 2021-04-12 PROCEDURE — 76817 TRANSVAGINAL US OBSTETRIC: CPT | Mod: 26

## 2021-04-12 PROCEDURE — 99284 EMERGENCY DEPT VISIT MOD MDM: CPT | Mod: 25

## 2021-04-12 PROCEDURE — 80048 BASIC METABOLIC PNL TOTAL CA: CPT

## 2021-04-12 PROCEDURE — 76817 TRANSVAGINAL US OBSTETRIC: CPT

## 2021-04-12 PROCEDURE — 81001 URINALYSIS AUTO W/SCOPE: CPT

## 2021-04-12 PROCEDURE — 96372 THER/PROPH/DIAG INJ SC/IM: CPT

## 2021-04-12 PROCEDURE — 83690 ASSAY OF LIPASE: CPT

## 2021-04-12 PROCEDURE — 87086 URINE CULTURE/COLONY COUNT: CPT

## 2021-04-12 PROCEDURE — 99285 EMERGENCY DEPT VISIT HI MDM: CPT

## 2021-04-12 PROCEDURE — 87491 CHLMYD TRACH DNA AMP PROBE: CPT

## 2021-04-12 PROCEDURE — 99282 EMERGENCY DEPT VISIT SF MDM: CPT

## 2021-04-12 PROCEDURE — 93975 VASCULAR STUDY: CPT | Mod: 26

## 2021-04-12 PROCEDURE — 87591 N.GONORRHOEAE DNA AMP PROB: CPT

## 2021-04-12 PROCEDURE — 84702 CHORIONIC GONADOTROPIN TEST: CPT

## 2021-04-12 PROCEDURE — 93975 VASCULAR STUDY: CPT

## 2021-04-12 PROCEDURE — 85027 COMPLETE CBC AUTOMATED: CPT

## 2021-04-12 RX ORDER — METHOTREXATE 2.5 MG/1
87.5 TABLET ORAL ONCE
Refills: 0 | Status: COMPLETED | OUTPATIENT
Start: 2021-04-12 | End: 2021-04-12

## 2021-04-12 RX ORDER — ACETAMINOPHEN 500 MG
975 TABLET ORAL ONCE
Refills: 0 | Status: COMPLETED | OUTPATIENT
Start: 2021-04-12 | End: 2021-04-12

## 2021-04-12 RX ORDER — NORGESTIMATE AND ETHINYL ESTRADIOL 7DAYSX3 LO
1 KIT ORAL
Qty: 30 | Refills: 2
Start: 2021-04-12 | End: 2021-07-10

## 2021-04-12 RX ADMIN — Medication 975 MILLIGRAM(S): at 14:03

## 2021-04-12 RX ADMIN — Medication 975 MILLIGRAM(S): at 15:00

## 2021-04-12 RX ADMIN — METHOTREXATE 87.5 MILLIGRAM(S): 2.5 TABLET ORAL at 17:07

## 2021-04-12 NOTE — ED PROVIDER NOTE - PATIENT PORTAL LINK FT
You can access the FollowMyHealth Patient Portal offered by [facility] by registering at the following website: [email or web address]. By joining [employer]’s FollowMyHealth portal, you will also be able to view your health information using other applications (apps) compatible with our system.

## 2021-04-12 NOTE — CONSULT NOTE ADULT - ASSESSMENT
31yo P2 LMP 2/28 presenting for follow-up bHCG in setting of pregnancy of unknown location. Pt was previously lost to f/u in Jan with bhCG of 15 and complex right adnexal mass. On return, imaging notable for 1cm echogenic structure in left adnexa vs 1.5cm echogenic structure in right adnexa. bHCG poorly uptrending from 21->23->32.    []f/u pelvic us  []f/u urine hCG     Guerita Joy R2 29yo P2 LMP 2/28 presenting for follow-up bHCG in setting of pregnancy of unknown location. Pt was previously lost to f/u in Jan with bhCG of 15 and complex right adnexal mass. On return, imaging notable for 1cm echogenic structure in left adnexa vs 1.5cm echogenic structure in right adnexa. bHCG poorly uptrending from 21->23->32.

## 2021-04-12 NOTE — ED PROVIDER NOTE - NSFOLLOWUPINSTRUCTIONS_ED_ALL_ED_FT
You were treated for a pregnancy of unknown location.    Return in THREE DAYS for a repeat blood work and to see OB.    Take tylenol for pain.     Please return to ER for new or concerning symptoms. You were treated for a pregnancy of unknown location.    Return in THREE DAYS for a repeat blood work and to see OB.    Take the birth control daily until told not to by OB.    Take tylenol for pain.     Please return to ER for new or concerning symptoms.

## 2021-04-12 NOTE — CONSULT NOTE ADULT - PROBLEM SELECTOR RECOMMENDATION 9
- patient counseled on hCG and ultrasound results  - recommend methotrexate for treatment of PUL  - patient counseled and without any contraindications (liver disease, renal disease, breast feeding) for methotrexate; patient informed of possible side effects  - consents signed and methotrexate order placed by Dr. Ca  - patient to follow in ED for 4/15 and 4/18 for repeat hCG  - recommend Sprintec for birth control as patient should not get pregnant for 3mo following her methotrexate; patient expressed understanding and would like Rx sent to her pharmacy [facility] [phone]    Pt seen with attending Dr. Lanny Lindsay MD PGY4

## 2021-04-12 NOTE — ED PROVIDER NOTE - OBJECTIVE STATEMENT
30 yoF, no major PMHx, A1, returning with abdominal pain. Seen at ER twice in last week for elevated HCG. Also had elevated HCG > 100 in  but was lost to follow up. Per pt had normal menstrual periods in-between. No VB. No fever or NVD. Mild dysuria. Has low abd and low abd pain. C section in the past. Doesn't have a primary OB. OB was consulted at last visit on 4/10/21.

## 2021-04-12 NOTE — ED PROVIDER NOTE - PHYSICAL EXAMINATION
General: alert, conversant, looks well, vitals reassuring  Head: atraumatic, normocephalic  Eyes: PERRL, EOMI, no scleral icterus  ENT: no epistaxis, normal phonation, airway patent  Neck: full ROM, no midline ttp  CV: RRR, no murmurs, HDS  Pulm: lungs CTA b/l, no wheezing, no respiratory distress  GI: abd soft, non tender, no guarding/rebound/masses  Back: normal ROM, no midline ttp, no signs of trauma  Extremities: normal ROM, joints stable, distal pulses intact, no edema  Neuro: alert, oriented x3, moving all extremities, interactive, normal memory/speech/gait   Derm: warm, dry, normal color, no rash/wounds

## 2021-04-12 NOTE — CONSULT NOTE ADULT - SUBJECTIVE AND OBJECTIVE BOX
R2 GYNECOLOGY CONSULT NOTE    HPI    31yo P2 LMP 2/28 presented to ED on 4/8 with c/o vaginal bleeding and abdominal cramping. Pt was previously with pregnancy of unknown location on 1/8/21 with bHCG 15 and US without IUP, complex right adnexal structure. She was lost to follow-up until presented to ED on 4/8 with vaginal bleeding and abdominal pain/cramping in setting of +UPT at [facility]. At that time, pt reported using 2-3 pads per day. b-HCG was 21 and TVUS showed no IUP, 1 cm echogenic structure in the left adnexa, no FF. She was instructed to return 4/10 for repeat bHCG.         OB/GYN HISTORY:   G1 2005 C/S malpresentation  G2 2010 repeat C/S  G3 ?Jan 2021 SAB? vs present  Denies fibroids, ov cysts, STIs, abnl Pap smears  OBGYN = 865    PMSH: fatty liver, C/S x2    Meds: MVI  All: NKDA        OB/GYN HISTORY:   PAST MEDICAL & SURGICAL HISTORY:  No pertinent past medical history    No significant past surgical history      Allergies    No Known Allergies    Intolerances      MEDICATIONS  (STANDING):    MEDICATIONS  (PRN):    FAMILY HISTORY:    SOCIAL HISTORY:  REVIEW OF SYSTEMS:    CONSTITUTIONAL: No weakness, fevers or chills  EYES/ENT: No visual changes;  No vertigo or throat pain   NECK: No pain or stiffness  RESPIRATORY: No cough, wheezing, hemoptysis; No shortness of breath  CARDIOVASCULAR: No chest pain or palpitations  GASTROINTESTINAL: No abdominal or epigastric pain. No nausea, vomiting, or hematemesis; No diarrhea or constipation. No melena or hematochezia.  GENITOURINARY: No dysuria, frequency or hematuria  NEUROLOGICAL: No numbness or weakness  SKIN: No itching, burning, rashes, or lesions   All other review of systems is negative unless indicated above.    Name of GYN Physician:  Date of Last Pap:  History of Abnormal Pap:  Date of Last Mammogram:  Date of Last Colonoscopy:    Vital Signs Last 24 Hrs  T(C): 36.9 (12 Apr 2021 11:08), Max: 36.9 (12 Apr 2021 11:08)  T(F): 98.4 (12 Apr 2021 11:08), Max: 98.4 (12 Apr 2021 11:08)  HR: 77 (12 Apr 2021 11:08) (77 - 77)  BP: 123/85 (12 Apr 2021 11:08) (123/85 - 123/85)  BP(mean): --  RR: 18 (12 Apr 2021 11:08) (18 - 18)  SpO2: 99% (12 Apr 2021 11:08) (99% - 99%)    PHYSICAL EXAM:      Constitutional: alert and oriented x 3    Breasts: no tenderness, no nodules    Respiratory: clear    Cardiovascular: regular rate and rhythm    Gastrointestinal: soft, non tender, + bowel sounds. No hepatosplenomegaly, no palpable masses    Genitourinary: NEFG  Cervix: closed/ long, no CMT  Uterus: normal size, non tender  Adnexa: non tender, no palpable masses    Rectal:     Extremities:    Neurological:    Skin:    Lymph Nodes:        LABS:                        13.1   7.17  )-----------( 338      ( 12 Apr 2021 11:45 )             40.6     04-12    139  |  104  |  13  ----------------------------<  96  3.9   |  24  |  0.61    Ca    9.4      12 Apr 2021 11:45    TPro  6.7  /  Alb  4.0  /  TBili  0.2  /  DBili  x   /  AST  37  /  ALT  51<H>  /  AlkPhos  68  04-10          Blood Type: B Positive      RADIOLOGY & ADDITIONAL STUDIES:           R2 GYNECOLOGY CONSULT NOTE    HPI  29yo P2 LMP 2/28 presented to ED on 4/8 with c/o vaginal bleeding and abdominal cramping. Pt was previously with pregnancy of unknown location on 1/8/21 with bHCG 15 and US without IUP, complex RIGHT adnexal structure. She was lost to follow-up until presented to ED on 4/8 with vaginal bleeding and abdominal pain/cramping in setting of +UPT at [facility].    4/8: Pt reported using 2-3 pads per day. b-HCG was 21, urine HCG was neg, and TVUS showed no IUP, 1 cm echogenic structure in the LEFT adnexa, no FF. She was instructed to return 4/10 for repeat bHCG.     4/10: Pt reported using 1 pad per day. b-HCG was 23.6 and TUVS showed no IUP, 1.5 cm echogenic structure in the RIGHT adnexa, no FF.     Today, pt denies continued vaginal bleeding. She reports continued 6/10 mid abdominal and low back pain; has not used pain medications at home. Reports pain is the same as on 4/10, improved with Tylenol she received on evaluation at that time. Reports mild dysuria. Denies CP, SOB, HA, nausea/vomiting, lightheadedness/dizziness.      OB/GYN HISTORY:   G1 2005 C/S malpresentation  G2 2010 repeat C/S  G3 ?Jan 2021 SAB? vs present  Denies fibroids, ov cysts, STIs, abnl Pap smears  OBGYN = 865    PMSH: fatty liver, C/S x2    Meds: MVI  All: NKDA    REVIEW OF SYSTEMS: All other review of systems is negative unless indicated above.    Vital Signs Last 24 Hrs  T(C): 36.9 (12 Apr 2021 11:08), Max: 36.9 (12 Apr 2021 11:08)  T(F): 98.4 (12 Apr 2021 11:08), Max: 98.4 (12 Apr 2021 11:08)  HR: 77 (12 Apr 2021 11:08) (77 - 77)  BP: 123/85 (12 Apr 2021 11:08) (123/85 - 123/85)  RR: 18 (12 Apr 2021 11:08) (18 - 18)  SpO2: 99% (12 Apr 2021 11:08) (99% - 99%)    PHYSICAL EXAM:  Gen: awake, alert, NAD, resting comfortably in bed  Chest: nonlabored breathing  Abd: soft, minimal TTP mid abdomen/suprapubic region; no rebound/guarding  : NEFG  Speculum: no blood in vault, no bleeding from cervix  Bimanual: nontender adnexa b/l, nontender fundus, no CMT  Ext: nontender      LABS:                        13.1   7.17  )-----------( 338      ( 12 Apr 2021 11:45 )             40.6     04-12    139  |  104  |  13  ----------------------------<  96  3.9   |  24  |  0.61    Ca    9.4      12 Apr 2021 11:45    TPro  6.7  /  Alb  4.0  /  TBili  0.2  /  DBili  x   /  AST  37  /  ALT  51<H>  /  AlkPhos  68  04-10    HCG Quantitative, Serum (01.08.21 @ 20:42): 15.0  HCG Quantitative, Serum (04.08.21 @ 15:59): 21.0  HCG Quantitative, Serum (04.10.21 @ 14:09): 23.6  HCG Quantitative, Serum (04.12.21 @ 11:45): 32.1    Blood Type: B Positive         R2 GYNECOLOGY CONSULT NOTE    HPI  31yo P2 LMP  presented to ED on  with c/o vaginal bleeding and abdominal cramping. Pt was previously with pregnancy of unknown location on 21 with bHCG 15 and US without IUP, complex RIGHT adnexal structure. She was lost to follow-up until presented to ED on  with vaginal bleeding and abdominal pain/cramping in setting of +UPT at [facility].    : Pt reported using 2-3 pads per day. b-HCG was 21, urine HCG was neg, and TVUS showed no IUP, 1 cm echogenic structure in the LEFT adnexa, no FF. She was instructed to return 4/10 for repeat bHCG.     4/10: Pt reported using 1 pad per day. b-HCG was 23.6 and TUVS showed no IUP, 1.5 cm echogenic structure in the RIGHT adnexa, no FF.     Today, pt denies continued vaginal bleeding. She reports continued 6/10 mid abdominal and low back pain; has not used pain medications at home. Reports pain is the same as on 4/10, improved with Tylenol she received on evaluation at that time. Reports mild dysuria. Denies CP, SOB, HA, nausea/vomiting, lightheadedness/dizziness.      OB/GYN HISTORY:   G1  C/S malpresentation  G2  repeat C/S  G3 ?2021 SAB? vs present  Denies fibroids, ov cysts, STIs, abnl Pap smears  OBGYN = 865    PMSH: fatty liver, C/S x2    Meds: MVI  All: NKDA    REVIEW OF SYSTEMS: All other review of systems is negative unless indicated above.    Vital Signs Last 24 Hrs  T(C): 36.9 (2021 11:08), Max: 36.9 (2021 11:08)  T(F): 98.4 (2021 11:08), Max: 98.4 (2021 11:08)  HR: 77 (2021 11:08) (77 - 77)  BP: 123/85 (2021 11:08) (123/85 - 123/85)  RR: 18 (2021 11:08) (18 - 18)  SpO2: 99% (2021 11:08) (99% - 99%)    PHYSICAL EXAM:  Gen: awake, alert, NAD, resting comfortably in bed  Chest: nonlabored breathing  Abd: soft, minimal TTP mid abdomen/suprapubic region; no rebound/guarding  : NEFG  Speculum: no blood in vault, no bleeding from cervix  Bimanual: nontender adnexa b/l, nontender fundus, no CMT  Ext: nontender      LABS:                        13.1   7.17  )-----------( 338      ( 2021 11:45 )             40.6     04-12    139  |  104  |  13  ----------------------------<  96  3.9   |  24  |  0.61    Ca    9.4      2021 11:45    TPro  6.7  /  Alb  4.0  /  TBili  0.2  /  DBili  x   /  AST  37  /  ALT  51<H>  /  AlkPhos  68  04-10    HCG Quantitative, Serum (21 @ 20:42): 15.0  HCG Quantitative, Serum (21 @ 15:59): 21.0  HCG Quantitative, Serum (04.10.21 @ 14:09): 23.6  HCG Quantitative, Serum (21 @ 11:45): 32.1    Blood Type: B Positive        < from: US Doppler Pelvis (21 @ 14:08) >  EXAM:  US OB TRANSVAGINAL                          EXAM:  US DPLX PELVIC                            PROCEDURE DATE:  2021            INTERPRETATION:  CLINICAL INFORMATION: Follow-up for pregnancy of unknown location. Serum hCG on 2028 was21, on 4/10/2021 23.6, and today 32.    LMP: 2021    Estimated Gestational Age by LMP: 6 weeks 1 day    COMPARISON: Prior ultrasounds dated 2021 and 4/10/2021.    Endovaginal pelvic sonogram as per order. Transabdominal pelvic sonogram was also performed as part of our standard protocol.    FINDINGS:  Uterus: 8.1 x 3.8 x 4.6 cm. A lower uterine segment  section scar is noted. No intrauterine gestational sac is seen.    Gestational Sac Size (mean): N/A  Gestations Sac Shape : N/A  Crown Rump Length: N/A  Estimated Gestational Age: N/A  Yolk Sac: N/A  Fetal Heart Rate: N/A    Right ovary: 2.7 x 2.1 x 1.8. Within normal limits.  Left ovary: 2.5 x 1.6 x 1.6. A 1.2 x 1.2 x 0.8 cm echogenic structure is noted in the left adnexa, similar in appearance to prior study dated 2021.    Fluid: None.    IMPRESSION:  Findings are again those of pregnancy of unknown location. A 1.2 x 1.2 x 0.8 cm echogenic structure is again noted in the left adnexa and left ectopic pregnancy is again considered.    These findings were discussed with Dr. Soriano on 2021 at 1:59 PM by Dr. Daniels with read back confirmation.                  BC DANIELS MD; Attending Radiologist  This document has been electronically signed. 2021  2:00PM    < end of copied text >

## 2021-04-12 NOTE — ED PROVIDER NOTE - PROGRESS NOTE DETAILS
ob called  case discussed will see her in ER  considering Methotrexate treatment ZR Bo, PGY3- OB recommended MTX for preg unk location. Pt given injection. Birth control sent to pharmacy of choice. Remains with minimal sx, normal hemodynamics. Stable for d/c. Will return in 2 days for beta check. Stable for d/c.

## 2021-04-12 NOTE — ED ADULT NURSE NOTE - NSIMPLEMENTINTERV_GEN_ALL_ED
Implemented All Universal Safety Interventions:  [place] to call system. Call bell, personal items and telephone within reach. Instruct patient to call for assistance. Room bathroom lighting operational. Non-slip footwear when patient is off stretcher. Physically safe environment: no spills, clutter or unnecessary equipment. Stretcher in lowest position, wheels locked, appropriate side rails in place.

## 2021-04-12 NOTE — CONSULT NOTE ADULT - ATTENDING COMMENTS
Patient seen and examined  Pregnancy HCG's explained and that patient has a bad pregnancy and that it needs to be treated  Risks/benefits and alternatives discussed and patient accepts the rational for methotrexate.

## 2021-04-12 NOTE — ED ADULT NURSE NOTE - OBJECTIVE STATEMENT
30F to ED c/o abdominal pain to middle of abd for about 4 days. Patient states that she has had some vaginal bleeding a few days ago but it stopped a few days ago. Patient states that her doctors are unsure if she is pregnant or not, and she was sent in to the ED to get her bloodwork checked for pregnancy. Patient states that she also has had some vomiting with the abdominal pain. Patient denies change in BM or dysuria/urinary frequency, denies vaginal bleeding. Patient states that if she is pregnant that this will be her 4th pregnancy. States that she has 2 sons, 1 pregnancy loss prior to this instance. Patient has 20 gauge IV placed to RAC. Patient is updated to plan of care, given a blanket for warmth, and is given the call bell and verbalizes understanding of how to use it. Patient is alert and oriented x4, calm/cooperative, NAD, VSS.

## 2021-04-12 NOTE — ED PROVIDER NOTE - CLINICAL SUMMARY MEDICAL DECISION MAKING FREE TEXT BOX
[place], PGY3- multiple visits with elevate HCG, preg unknown location. Returning for beta check. Will re-image as question of abnormal adnexal findings on prior sono. OB c/s. Looks well. Abd benign. No signs of shock. Not consistent with rupture. 30 y old   multiple visits with elevate HCG, preg unknown location. Returning for beta check. Will re-image as question of abnormal adnexal findings on prior sono. OB c/s. Looks well. Abd benign. No signs of shock. Not consistent with rupture.ZR

## 2021-04-13 LAB
C TRACH RRNA SPEC QL NAA+PROBE: SIGNIFICANT CHANGE UP
CULTURE RESULTS: SIGNIFICANT CHANGE UP
N GONORRHOEA RRNA SPEC QL NAA+PROBE: SIGNIFICANT CHANGE UP
SPECIMEN SOURCE: SIGNIFICANT CHANGE UP
SPECIMEN SOURCE: SIGNIFICANT CHANGE UP

## 2021-04-15 ENCOUNTER — EMERGENCY (EMERGENCY)
Facility: HOSPITAL | Age: 31
LOS: 1 days | Discharge: HOME | End: 2021-04-15
Attending: STUDENT IN AN ORGANIZED HEALTH CARE EDUCATION/TRAINING PROGRAM
Payer: MEDICAID

## 2021-04-15 VITALS
OXYGEN SATURATION: 99 % | WEIGHT: 145.06 LBS | RESPIRATION RATE: 20 BRPM | DIASTOLIC BLOOD PRESSURE: 72 MMHG | HEART RATE: 74 BPM | TEMPERATURE: 98 F | SYSTOLIC BLOOD PRESSURE: 111 MMHG | HEIGHT: 60 IN

## 2021-04-15 LAB
ALBUMIN SERPL ELPH-MCNC: 4.4 G/DL — SIGNIFICANT CHANGE UP (ref 3.3–5)
ALP SERPL-CCNC: 66 U/L — SIGNIFICANT CHANGE UP (ref 40–120)
ALT FLD-CCNC: 52 U/L — HIGH (ref 10–45)
ANION GAP SERPL CALC-SCNC: 12 MMOL/L — SIGNIFICANT CHANGE UP (ref 5–17)
AST SERPL-CCNC: 35 U/L — SIGNIFICANT CHANGE UP (ref 10–40)
BILIRUB SERPL-MCNC: 0.5 MG/DL — SIGNIFICANT CHANGE UP (ref 0.2–1.2)
BUN SERPL-MCNC: 11 MG/DL — SIGNIFICANT CHANGE UP (ref 7–23)
CALCIUM SERPL-MCNC: 9.2 MG/DL — SIGNIFICANT CHANGE UP (ref 8.4–10.5)
CHLORIDE SERPL-SCNC: 104 MMOL/L — SIGNIFICANT CHANGE UP (ref 96–108)
CO2 SERPL-SCNC: 23 MMOL/L — SIGNIFICANT CHANGE UP (ref 22–31)
CREAT SERPL-MCNC: 0.6 MG/DL — SIGNIFICANT CHANGE UP (ref 0.5–1.3)
GLUCOSE SERPL-MCNC: 86 MG/DL — SIGNIFICANT CHANGE UP (ref 70–99)
HCG SERPL-ACNC: 39.4 MIU/ML — HIGH
HCT VFR BLD CALC: 38.9 % — SIGNIFICANT CHANGE UP (ref 34.5–45)
HGB BLD-MCNC: 12.5 G/DL — SIGNIFICANT CHANGE UP (ref 11.5–15.5)
MCHC RBC-ENTMCNC: 28 PG — SIGNIFICANT CHANGE UP (ref 27–34)
MCHC RBC-ENTMCNC: 32.1 GM/DL — SIGNIFICANT CHANGE UP (ref 32–36)
MCV RBC AUTO: 87.2 FL — SIGNIFICANT CHANGE UP (ref 80–100)
NRBC # BLD: 0 /100 WBCS — SIGNIFICANT CHANGE UP (ref 0–0)
PLATELET # BLD AUTO: 356 K/UL — SIGNIFICANT CHANGE UP (ref 150–400)
POTASSIUM SERPL-MCNC: 3.7 MMOL/L — SIGNIFICANT CHANGE UP (ref 3.5–5.3)
POTASSIUM SERPL-SCNC: 3.7 MMOL/L — SIGNIFICANT CHANGE UP (ref 3.5–5.3)
PROT SERPL-MCNC: 7.3 G/DL — SIGNIFICANT CHANGE UP (ref 6–8.3)
RBC # BLD: 4.46 M/UL — SIGNIFICANT CHANGE UP (ref 3.8–5.2)
RBC # FLD: 12.7 % — SIGNIFICANT CHANGE UP (ref 10.3–14.5)
SODIUM SERPL-SCNC: 139 MMOL/L — SIGNIFICANT CHANGE UP (ref 135–145)
WBC # BLD: 7.05 K/UL — SIGNIFICANT CHANGE UP (ref 3.8–10.5)
WBC # FLD AUTO: 7.05 K/UL — SIGNIFICANT CHANGE UP (ref 3.8–10.5)

## 2021-04-15 PROCEDURE — 99284 EMERGENCY DEPT VISIT MOD MDM: CPT

## 2021-04-15 PROCEDURE — 99284 EMERGENCY DEPT VISIT MOD MDM: CPT | Mod: 25

## 2021-04-15 PROCEDURE — 93975 VASCULAR STUDY: CPT | Mod: 26

## 2021-04-15 PROCEDURE — 99214 OFFICE O/P EST MOD 30 MIN: CPT

## 2021-04-15 PROCEDURE — 84702 CHORIONIC GONADOTROPIN TEST: CPT

## 2021-04-15 PROCEDURE — 93975 VASCULAR STUDY: CPT

## 2021-04-15 PROCEDURE — 76817 TRANSVAGINAL US OBSTETRIC: CPT

## 2021-04-15 PROCEDURE — 85027 COMPLETE CBC AUTOMATED: CPT

## 2021-04-15 PROCEDURE — 80053 COMPREHEN METABOLIC PANEL: CPT

## 2021-04-15 PROCEDURE — 76817 TRANSVAGINAL US OBSTETRIC: CPT | Mod: 26

## 2021-04-15 RX ORDER — ACETAMINOPHEN 500 MG
975 TABLET ORAL ONCE
Refills: 0 | Status: COMPLETED | OUTPATIENT
Start: 2021-04-15 | End: 2021-04-15

## 2021-04-15 RX ADMIN — Medication 975 MILLIGRAM(S): at 19:35

## 2021-04-15 NOTE — ED PROVIDER NOTE - NSFOLLOWUPINSTRUCTIONS_ED_ALL_ED_FT
1. Follow up with your primary care physician within 2-3days for reevaluation.  2.  Return to the Emergency Department for worsening, progressive or any other concerning symptoms.   3. Follow up in the ER on Sunday 3 days for repeat  beta hcg!!!!!!!!!   4. Stay well hydrated and drink plenty of fluids

## 2021-04-15 NOTE — ED ADULT NURSE NOTE - OBJECTIVE STATEMENT
Pt is a 30 yr old female sent in by her OB for a repeat HCG check. Pt states there is a possibility she is pregnant- but she was told differing things from different physicians. Pt states she has been having three weeks of lower abd pain radiating to her back, and she has a headache. Pain is constant and cramping in sensation. She also endorses she was having two days worth of vaginal bleeding last Wed and Thurs. Pt has no other complaints at this time. This is the patient's fourth pregnancy (last January she had a miscarriage- and she has two other children). pt is a/ox 3- vitals stable.

## 2021-04-15 NOTE — ED PROVIDER NOTE - PATIENT PORTAL LINK FT
You can access the FollowMyHealth Patient Portal offered by [facility] by registering at the following website: [email or web address]. By joining [employer]’s FollowMyHealth portal, you will also be able to view your health information using other applications (apps) compatible with our system.

## 2021-04-15 NOTE — ED PROVIDER NOTE - CLINICAL SUMMARY MEDICAL DECISION MAKING FREE TEXT BOX
pt here for repeat hcg- s/p methotrexate-  ob aware- sono ordered for abd cramping-  preg of unknown  location-  repeat sono in 3 days on sunday

## 2021-04-15 NOTE — CONSULT NOTE ADULT - ASSESSMENT
31yo P2 LMP 2/28 presenting for follow-up bHCG in setting of pregnancy of unknown location. Pt was previously lost to f/u in Jan with bhCG of 15 and complex right adnexal mass. Imaging notable for 1cm echogenic structure in left adnexa vs 1.5cm echogenic structure in right adnexa.   - 21->23->32 (4/12) + MTX -> 39.4 (4/15-Day4)  - f/u TVUS    Lito Pelletier PGY2  31yo P2 LMP 2/28 presenting for follow-up bHCG in setting of pregnancy of unknown location. Pt was previously lost to f/u in Jan with bhCG of 15 and complex right adnexal mass. Imaging notable for 1cm echogenic structure in left adnexa vs 1.5cm echogenic structure in right adnexa.   - 21->23->32 (4/12) + MTX -> 39.4 (4/15-Day4)  - Pt to followup in clinic vs ED on 4/18 for rpt hCG    seen by  and Dr.David Lito Pelletier PGY2

## 2021-04-15 NOTE — ED PROVIDER NOTE - PROGRESS NOTE DETAILS
PT seen by ob gyn- sono ordered- ob aware of hcg results Ob aware of sono results and will come to eval  pt Pt seen and eval by ob gyn team, ok for pt to dc home. PT to return to the ER in 3 days on sunday  for repeat beta hcg  for day 7 lab value s/p methotrexate

## 2021-04-15 NOTE — ED ADULT NURSE NOTE - NSIMPLEMENTINTERV_GEN_ALL_ED
Implemented All Universal Safety Interventions:  [place] to call system. Call bell, personal items and telephone within reach. Instruct patient to call for assistance. Room bathroom lighting operational. Non-slip footwear when patient is off stretcher. Physically safe environment: no spills, clutter or unnecessary equipment. Stretcher in lowest position, wheels locked, appropriate side rails in place.

## 2021-04-15 NOTE — CONSULT NOTE ADULT - ATTENDING COMMENTS
Assessed with      Patient presents for betaHCG follow up. Patient reports having lower abdominal discomfort, unchanged from prior presentation. Denies syncope, headaches, vomiting.   Past medical and surgical history reviewed. Allergies confirmed   Vitals reviewed   Gen: no acute distress   Abd: soft, no rebound or guarding   Perineum: deferred   Ext: no calf tenderness   Labs and imaging reviewed, betaHCG 39.4 (from 32.1 on 4/12)   Plan   -return on 4/18 for repeat betaHCG level   -return precautions given   -information for private GYN office given to patient     ARACELY Hopper

## 2021-04-15 NOTE — ED PROVIDER NOTE - OBJECTIVE STATEMENT
29 yo female in orange 58 presents to the ER for evaluation of repeats hcg. PT awake, alert oriented x3 states "I was having vaginal bleeding for two days and I came to the ER on Monday.  I was told that I needed to come back to the ER today for repeat blood work.   I no longer have any bleeding and I feel cramping only now". Pt  , with recent miscarriage in January of this year.

## 2021-04-15 NOTE — CONSULT NOTE ADULT - SUBJECTIVE AND OBJECTIVE BOX
MALLIKA EPSTEIN  30y  Female [id number]    HPI  29yo P2 LMP 2/28 presented to ED on 4/8 with c/o vaginal bleeding and abdominal cramping. Pt was previously with pregnancy of unknown location on 1/8/21 with bHCG 15 and US without IUP, complex RIGHT adnexal structure. She was lost to follow-up until presented to ED on 4/8 with vaginal bleeding and abdominal pain/cramping in setting of +UPT at [facility].    4/8: Pt reported using 2-3 pads per day. b-HCG was 21, urine HCG was neg, and TVUS showed no IUP, 1 cm echogenic structure in the LEFT adnexa, no FF. She was instructed to return 4/10 for repeat bHCG.     4/10: Pt reported using 1 pad per day. b-HCG was 23.6 and TUVS showed no IUP, 1.5 cm echogenic structure in the RIGHT adnexa, no FF.     4/12: 6/10 mid abdominal and low back pain; has not used pain medications at home. MTX given    4/15: Presents for day 4 of bhCG draw after receiving MTX. No vaginal bleeding. Is complaining of upper to mid abdominal pain at this time.    OB/GYN HISTORY:   G1 2005 C/S malpresentation  G2 2010 repeat C/S  G3 ?Jan 2021 SAB? vs present  Denies fibroids, ov cysts, STIs, abnl Pap smears  OBGYN = [id number]    PMSH: fatty liver, C/S x2    Meds: MVI  All: NKDA        Vital Signs Last 24 Hrs  T(C): 36.6 (15 Apr 2021 13:40), Max: 36.6 (15 Apr 2021 13:40)  T(F): 97.9 (15 Apr 2021 13:40), Max: 97.9 (15 Apr 2021 13:40)  HR: 69 (15 Apr 2021 18:56) (69 - 74)  BP: 102/73 (15 Apr 2021 18:56) (102/73 - 111/72)  BP(mean): 82 (15 Apr 2021 18:56) (79 - 82)  RR: 19 (15 Apr 2021 18:56) (19 - 20)  SpO2: 100% (15 Apr 2021 18:56) (99% - 100%)    Physical Exam:   General: sitting comftorably in bed, NAD   HEENT: neck supple, full ROM  CV: RR S1S2 no m/r/g  Lungs: CTA b/l, good air flow b/l   Back: No CVA tenderness  Abd: Soft, non-tender, non-distended.  Bowel sounds present.    :  No bleeding on pad.    External labia wnl.  Bimanual exam with no cervical motion tenderness, uterus wnl, adnexa non palpable b/l.  Cervix closed   Ext: non-tender b/l, no edema     LABS:    HCG Quantitative, Serum (01.08.21 @ 20:42): 15.0  HCG Quantitative, Serum (04.08.21 @ 15:59): 21.0  HCG Quantitative, Serum (04.10.21 @ 14:09): 23.6  HCG Quantitative, Serum (04.12.21 @ 11:45): 32.1    hHCG Quantitative, Serum (04.15.21 @ 15:46): 39.4      RADIOLOGY & ADDITIONAL STUDIES:  []TVUS MALLIKA EPSTEIN  30y  Female [id number]    HPI  31yo P2 LMP 2/28 presented to ED on 4/8 with c/o vaginal bleeding and abdominal cramping. Pt was previously with pregnancy of unknown location on 1/8/21 with bHCG 15 and US without IUP, complex RIGHT adnexal structure. She was lost to follow-up until presented to ED on 4/8 with vaginal bleeding and abdominal pain/cramping in setting of +UPT at [facility].    4/8: Pt reported using 2-3 pads per day. b-HCG was 21, urine HCG was neg, and TVUS showed no IUP, 1 cm echogenic structure in the LEFT adnexa, no FF. She was instructed to return 4/10 for repeat bHCG.     4/10: Pt reported using 1 pad per day. b-HCG was 23.6 and TUVS showed no IUP, 1.5 cm echogenic structure in the RIGHT adnexa, no FF.     4/12: 6/10 mid abdominal and low back pain; has not used pain medications at home. MTX given    4/15: Presents for day 4 of bhCG draw after receiving MTX. No vaginal bleeding. Is complaining of upper to mid abdominal pain at this time.    OB/GYN HISTORY:   G1 2005 C/S malpresentation  G2 2010 repeat C/S  G3 ?Jan 2021 SAB? vs present  Denies fibroids, ov cysts, STIs, abnl Pap smears  OBGYN = 865    PMSH: fatty liver, C/S x2    Meds: MVI  All: NKDA        Vital Signs Last 24 Hrs  T(C): 36.6 (15 Apr 2021 13:40), Max: 36.6 (15 Apr 2021 13:40)  T(F): 97.9 (15 Apr 2021 13:40), Max: 97.9 (15 Apr 2021 13:40)  HR: 69 (15 Apr 2021 18:56) (69 - 74)  BP: 102/73 (15 Apr 2021 18:56) (102/73 - 111/72)  BP(mean): 82 (15 Apr 2021 18:56) (79 - 82)  RR: 19 (15 Apr 2021 18:56) (19 - 20)  SpO2: 100% (15 Apr 2021 18:56) (99% - 100%)    Physical Exam:   General: sitting comftorably in bed, NAD   HEENT: neck supple, full ROM  CV: RR S1S2 no m/r/g  Lungs: CTA b/l, good air flow b/l   Back: No CVA tenderness  Abd: Soft, non-tender, non-distended.  Bowel sounds present.    :  No bleeding on pad.    External labia wnl.  Bimanual exam with no cervical motion tenderness, uterus wnl, adnexa non palpable b/l.  Cervix closed   Ext: non-tender b/l, no edema     LABS:    HCG Quantitative, Serum (01.08.21 @ 20:42): 15.0  HCG Quantitative, Serum (04.08.21 @ 15:59): 21.0  HCG Quantitative, Serum (04.10.21 @ 14:09): 23.6  HCG Quantitative, Serum (04.12.21 @ 11:45): 32.1    hHCG Quantitative, Serum (04.15.21 @ 15:46): 39.4      RADIOLOGY & ADDITIONAL STUDIES:  < from: US Transvaginal, OB (04.15.21 @ 21:33) >    EXAM:  US OB TRANSVAGINAL                          EXAM:  US DPLX PELVIC                            PROCEDURE DATE:  04/15/2021            INTERPRETATION:  CLINICAL INFORMATION: Elevated serum beta-hCG despite methotrexate administration. Evaluation for ectopic pregnancy.    LMP: 2/26/2021    Estimated Gestational Age by LMP: 6 weeks, 6 days    Serum beta-hCG:  *  4/15/2021: 39.4 mIU/mL  *  4/12/2021: 32.1 mIU/mL  *  4/10/2021: 23.6 mIU/mL    COMPARISON: Pelvic ultrasound 4/10/2021, 4/12/2021.    Endovaginal and transabdominal pelvic sonogram. Color and Spectral Doppler was performed.    FINDINGS:  Uterus: 7.5 x 3.7 x 4.7 cm.  No evidence of intrauterine pregnancy.    Right ovary: 2.1 x 1.9 x 1.8 cm with multiple follicles.  Normal arterialand venous waveforms..  There is a questionable 10 x 8 x 8 mm nodular isoechoic focus in the right tube.  Left ovary: 2.1 x 1.5 x 1.5 cm.  Normal arterial and venous waveforms.  Previously visualized 12 x 12 x 8 mm nodular echogenic focus in the lefttube is not identified on the current exam.    Fluid: None.    IMPRESSION:    Pregnancy of unknown location.  No intrauterine pregnancy or definitive ectopic.    There is a questionable 10 x 8 x 8 mm nodular isoechoic focus in the right fallopian tube.    Previously visualized 12 x 12 x 8 mm nodular echogenic focus in the left tube is not identified on the current exam.    There is no evidence of hemoperitoneum or free fluid in the pelvis to indicate a ruptured ectopic.    Correlation with serialbeta-hCG levels and continued follow-up ultrasound and 2-3 days is recommended.    These findings were discussed with Dr. Rodriguez at 4/15/2021 11:01 PM by Dr. Nadia Mancilla of Radiology with read back confirmation.    NADIA MANCILLA MD; Resident Radiology  This document has been electronically signed.  LAW GAINES MD; Attending Radiologist  This document has been electronically signed. Apr 15 2021 11:19PM    < end of copied text >

## 2021-04-16 VITALS
TEMPERATURE: 98 F | RESPIRATION RATE: 18 BRPM | HEART RATE: 62 BPM | SYSTOLIC BLOOD PRESSURE: 120 MMHG | DIASTOLIC BLOOD PRESSURE: 62 MMHG | OXYGEN SATURATION: 100 %

## 2021-04-18 ENCOUNTER — EMERGENCY (EMERGENCY)
Facility: HOSPITAL | Age: 31
LOS: 1 days | Discharge: HOME | End: 2021-04-18
Attending: EMERGENCY MEDICINE
Payer: MEDICAID

## 2021-04-18 VITALS
SYSTOLIC BLOOD PRESSURE: 110 MMHG | HEIGHT: 60 IN | TEMPERATURE: 98 F | OXYGEN SATURATION: 99 % | RESPIRATION RATE: 16 BRPM | HEART RATE: 73 BPM | WEIGHT: 145.06 LBS | DIASTOLIC BLOOD PRESSURE: 73 MMHG

## 2021-04-18 VITALS
OXYGEN SATURATION: 100 % | TEMPERATURE: 98 F | HEART RATE: 70 BPM | SYSTOLIC BLOOD PRESSURE: 99 MMHG | DIASTOLIC BLOOD PRESSURE: 77 MMHG | RESPIRATION RATE: 16 BRPM

## 2021-04-18 LAB
ALBUMIN SERPL ELPH-MCNC: 4.2 G/DL — SIGNIFICANT CHANGE UP (ref 3.3–5)
ALP SERPL-CCNC: 71 U/L — SIGNIFICANT CHANGE UP (ref 40–120)
ALT FLD-CCNC: 37 U/L — SIGNIFICANT CHANGE UP (ref 10–45)
ANION GAP SERPL CALC-SCNC: 10 MMOL/L — SIGNIFICANT CHANGE UP (ref 5–17)
APPEARANCE UR: ABNORMAL
APTT BLD: 27.6 SEC — SIGNIFICANT CHANGE UP (ref 27.5–35.5)
AST SERPL-CCNC: 27 U/L — SIGNIFICANT CHANGE UP (ref 10–40)
BACTERIA # UR AUTO: NEGATIVE — SIGNIFICANT CHANGE UP
BILIRUB SERPL-MCNC: 0.2 MG/DL — SIGNIFICANT CHANGE UP (ref 0.2–1.2)
BILIRUB UR-MCNC: NEGATIVE — SIGNIFICANT CHANGE UP
BLD GP AB SCN SERPL QL: NEGATIVE — SIGNIFICANT CHANGE UP
BUN SERPL-MCNC: 11 MG/DL — SIGNIFICANT CHANGE UP (ref 7–23)
CALCIUM SERPL-MCNC: 9 MG/DL — SIGNIFICANT CHANGE UP (ref 8.4–10.5)
CHLORIDE SERPL-SCNC: 104 MMOL/L — SIGNIFICANT CHANGE UP (ref 96–108)
CO2 SERPL-SCNC: 24 MMOL/L — SIGNIFICANT CHANGE UP (ref 22–31)
COLOR SPEC: YELLOW — SIGNIFICANT CHANGE UP
CREAT SERPL-MCNC: 0.58 MG/DL — SIGNIFICANT CHANGE UP (ref 0.5–1.3)
DIFF PNL FLD: ABNORMAL
EPI CELLS # UR: 12 /HPF — HIGH
GLUCOSE SERPL-MCNC: 93 MG/DL — SIGNIFICANT CHANGE UP (ref 70–99)
GLUCOSE UR QL: NEGATIVE — SIGNIFICANT CHANGE UP
HCG SERPL-ACNC: 30.6 MIU/ML — HIGH
HCT VFR BLD CALC: 38.3 % — SIGNIFICANT CHANGE UP (ref 34.5–45)
HGB BLD-MCNC: 12.3 G/DL — SIGNIFICANT CHANGE UP (ref 11.5–15.5)
HYALINE CASTS # UR AUTO: 2 /LPF — SIGNIFICANT CHANGE UP (ref 0–2)
INR BLD: 1.12 RATIO — SIGNIFICANT CHANGE UP (ref 0.88–1.16)
KETONES UR-MCNC: NEGATIVE — SIGNIFICANT CHANGE UP
LEUKOCYTE ESTERASE UR-ACNC: ABNORMAL
MCHC RBC-ENTMCNC: 28.4 PG — SIGNIFICANT CHANGE UP (ref 27–34)
MCHC RBC-ENTMCNC: 32.1 GM/DL — SIGNIFICANT CHANGE UP (ref 32–36)
MCV RBC AUTO: 88.5 FL — SIGNIFICANT CHANGE UP (ref 80–100)
NITRITE UR-MCNC: NEGATIVE — SIGNIFICANT CHANGE UP
NRBC # BLD: 0 /100 WBCS — SIGNIFICANT CHANGE UP (ref 0–0)
PH UR: 6 — SIGNIFICANT CHANGE UP (ref 5–8)
PLATELET # BLD AUTO: 294 K/UL — SIGNIFICANT CHANGE UP (ref 150–400)
POTASSIUM SERPL-MCNC: 4.1 MMOL/L — SIGNIFICANT CHANGE UP (ref 3.5–5.3)
POTASSIUM SERPL-SCNC: 4.1 MMOL/L — SIGNIFICANT CHANGE UP (ref 3.5–5.3)
PROT SERPL-MCNC: 7 G/DL — SIGNIFICANT CHANGE UP (ref 6–8.3)
PROT UR-MCNC: NEGATIVE — SIGNIFICANT CHANGE UP
PROTHROM AB SERPL-ACNC: 13.4 SEC — SIGNIFICANT CHANGE UP (ref 10.6–13.6)
RBC # BLD: 4.33 M/UL — SIGNIFICANT CHANGE UP (ref 3.8–5.2)
RBC # FLD: 12.4 % — SIGNIFICANT CHANGE UP (ref 10.3–14.5)
RBC CASTS # UR COMP ASSIST: 1 /HPF — SIGNIFICANT CHANGE UP (ref 0–4)
RH IG SCN BLD-IMP: POSITIVE — SIGNIFICANT CHANGE UP
SODIUM SERPL-SCNC: 138 MMOL/L — SIGNIFICANT CHANGE UP (ref 135–145)
SP GR SPEC: 1.01 — SIGNIFICANT CHANGE UP (ref 1.01–1.02)
UROBILINOGEN FLD QL: ABNORMAL
WBC # BLD: 5.79 K/UL — SIGNIFICANT CHANGE UP (ref 3.8–10.5)
WBC # FLD AUTO: 5.79 K/UL — SIGNIFICANT CHANGE UP (ref 3.8–10.5)
WBC UR QL: 1 /HPF — SIGNIFICANT CHANGE UP (ref 0–5)

## 2021-04-18 PROCEDURE — 85730 THROMBOPLASTIN TIME PARTIAL: CPT

## 2021-04-18 PROCEDURE — 93975 VASCULAR STUDY: CPT

## 2021-04-18 PROCEDURE — 86850 RBC ANTIBODY SCREEN: CPT

## 2021-04-18 PROCEDURE — 76817 TRANSVAGINAL US OBSTETRIC: CPT | Mod: 26

## 2021-04-18 PROCEDURE — 36000 PLACE NEEDLE IN VEIN: CPT

## 2021-04-18 PROCEDURE — 80053 COMPREHEN METABOLIC PANEL: CPT

## 2021-04-18 PROCEDURE — 85027 COMPLETE CBC AUTOMATED: CPT

## 2021-04-18 PROCEDURE — 81001 URINALYSIS AUTO W/SCOPE: CPT

## 2021-04-18 PROCEDURE — 99285 EMERGENCY DEPT VISIT HI MDM: CPT

## 2021-04-18 PROCEDURE — 93975 VASCULAR STUDY: CPT | Mod: 26

## 2021-04-18 PROCEDURE — 86901 BLOOD TYPING SEROLOGIC RH(D): CPT

## 2021-04-18 PROCEDURE — 86900 BLOOD TYPING SEROLOGIC ABO: CPT

## 2021-04-18 PROCEDURE — 76817 TRANSVAGINAL US OBSTETRIC: CPT

## 2021-04-18 PROCEDURE — 85610 PROTHROMBIN TIME: CPT

## 2021-04-18 PROCEDURE — 87086 URINE CULTURE/COLONY COUNT: CPT

## 2021-04-18 PROCEDURE — 99284 EMERGENCY DEPT VISIT MOD MDM: CPT | Mod: 25

## 2021-04-18 PROCEDURE — 84702 CHORIONIC GONADOTROPIN TEST: CPT

## 2021-04-18 NOTE — ED ADULT NURSE REASSESSMENT NOTE - NS ED NURSE REASSESS COMMENT FT1
pt dispo as DC. UA and UC ordered following dispo, UA and UC sent to lab. pending US results prior to DC.

## 2021-04-18 NOTE — CONSULT NOTE ADULT - SUBJECTIVE AND OBJECTIVE BOX
**INCOMPLETE**    MALLIKA EPSTEIN is a 30y P2 LMP 2/28 presenting to the ED with PUL status post MTX. See history below (per chart review and pt confirmation):     Presented to ED 4/8 with c/o vaginal bleeding and abdominal cramping. Pt was previously with pregnancy of unknown location on 1/8/21 with bHCG 15 and US without IUP, complex RIGHT adnexal structure. She was lost to follow-up until presented to ED on 4/8 with vaginal bleeding and abdominal pain/cramping in setting of +UPT at [facility].    4/8: Pt reported using 2-3 pads per day. b-HCG was 21, urine HCG was neg, and TVUS showed no IUP, 1 cm echogenic structure in the LEFT adnexa, no FF. She was instructed to return 4/10 for repeat bHCG.     4/10: Pt reported using 1 pad per day. b-HCG was 23.6 and TUVS showed no IUP, 1.5 cm echogenic structure in the RIGHT adnexa, no FF.     4/12: 6/10 mid abdominal and low back pain; has not used pain medications at home. bhcg 32.4 MTX given    4/15: Presented for f/u day 4 after receiving MTX. bhCG 39.4. No vaginal bleeding. Complaining of upper to mid abdominal pain.    4/16: Presenting for day 7 f/u status post MTX. bHCG today is ***    OB/GYN HISTORY:   G1 2005 C/S malpresentation  G2 2010 repeat C/S  G3 ?Jan 2021 SAB? vs present  Denies fibroids, ov cysts, STIs, abnl Pap smears  OBGYN = 865    PMSH: fatty liver, C/S x2    Meds: MVI  All: NKDA                                           REVIEW OF SYSTEMS:  CONSTITUTIONAL: No weakness, fevers or chills  EYES/ENT: No visual changes  NECK: No pain or stiffness  RESPIRATORY: No cough, wheezing; No shortness of breath  CARDIOVASCULAR: No chest pain or palpitations  GASTROINTESTINAL: No abdominal or epigastric pain. No nausea, vomiting; No diarrhea or constipation  GENITOURINARY: No dysuria, frequency or hematuria  NEUROLOGICAL: No headache, LOC  All other review of systems is negative unless indicated above      FAMILY HISTORY:  N/a     SOCIAL HISTORY:  ***    Vital Signs Last 24 Hrs  T(C): 36.8 (18 Apr 2021 11:12), Max: 36.8 (18 Apr 2021 11:12)  T(F): 98.2 (18 Apr 2021 11:12), Max: 98.2 (18 Apr 2021 11:12)  HR: 70 (18 Apr 2021 12:14) (70 - 73)  BP: 99/61 (18 Apr 2021 12:14) (99/61 - 110/73)  BP(mean): --  RR: 18 (18 Apr 2021 12:14) (16 - 18)  SpO2: 99% (18 Apr 2021 12:14) (99% - 99%)    PHYSICAL EXAM:  Constitutional: NAD, awake and alert, well-developed  HEENT: Normal Hearing,  Neck: Soft and supple  Respiratory: Breath sounds are clear bilaterally, No wheezing, rales or rhonchi  Cardiovascular: S1 and S2, regular rate and rhythm, no Murmurs, gallops or rubs  Gastrointestinal: Bowel Sounds present, soft, nontender, nondistended, no guarding, no rebound  Genitourinary: vaginal bleeding***, cervical os ***, nontender on bimanual exam    Extremities: No peripheral edema  Vascular: 2+ peripheral pulses  Neurological: A/O x 3, no focal deficits  Skin: No rashes    LABS:  (HCG pending)      MALLIKA EPSTEIN is a 30y P2 LMP  presenting to the ED with PUL in setting of  1cm echogenic structure in left adnexa vs 1.5cm echogenic structure in right adnexa. She received MTX  when bHCG was 32, presented for f/u day 4 on 4/15 (hCG 39.4) and presents today for her day 7 hcg which is 30 - an appropriate response. Today she reports minimal abdominal discomfort at baseline  and some abdominal pain with ambulation - she's taken Tylenol at home which improved her symptoms. She denies any vaginal bleeding or n/v.    OB/GYN HISTORY:   G1  C/S malpresentation  G2  repeat C/S  G3 ?2021 SAB? vs present  Denies fibroids, ov cysts, STIs, abnl Pap smears  OBGYN = 865    PMSH: C/S x2    Meds: MVI  All: NKDA                                           REVIEW OF SYSTEMS:  CONSTITUTIONAL: No weakness, fevers or chills  EYES/ENT: No visual changes  RESPIRATORY: No cough, wheezing; No shortness of breath  CARDIOVASCULAR: No chest pain or palpitations  GASTROINTESTINAL: See HPI  GENITOURINARY: No dysuria, frequency or hematuria  NEUROLOGICAL: No headache, LOC  All other review of systems is negative unless indicated above      FAMILY HISTORY:  N/a     SOCIAL HISTORY:  Denies     Vital Signs Last 24 Hrs  T(C): 36.8 (2021 11:12), Max: 36.8 (2021 11:12)  T(F): 98.2 (2021 11:12), Max: 98.2 (2021 11:12)  HR: 70 (2021 12:14) (70 - 73)  BP: 99/61 (2021 12:14) (99/61 - 110/73)  BP(mean): --  RR: 18 (2021 12:14) (16 - 18)  SpO2: 99% (2021 12:14) (99% - 99%)    PHYSICAL EXAM:  Constitutional: NAD, awake and alert, well-developed  HEENT: Normal Hearing,  Neck: Soft and supple  Respiratory: Breath sounds are clear bilaterally, No wheezing, rales or rhonchi  Cardiovascular: S1 and S2, regular rate and rhythm, no Murmurs, gallops or rubs  Gastrointestinal: Bowel Sounds present, soft, nontender, nondistended, no guarding, no rebound  Genitourinary: no vaginal bleeding, no CMT, minimal tenderness to palpation of suprapubic region on bimanual exam    Neurological: A/O x 3, no focal deficits  Skin: No rashes    LABS:             12.3   5.79  )-----------( 294      ( 04-18 @ 12:21 )             38.3     bHC.6     MALLIKA EPSTEIN is a 30y P2 LMP  presenting to the ED with PUL in setting of  1cm echogenic structure in left adnexa vs 1.5cm echogenic structure in right adnexa. She received MTX  when bHCG was 32, presented for f/u day 4 on 4/15 (hCG 39.4) and presents today for her day 7 hcg which is 30.6 - an appropriate response (> 15%). Today she reports minimal abdominal discomfort at baseline  and some abdominal pain with ambulation - she's taken Tylenol at home which improved her symptoms. She denies any vaginal bleeding or n/v.    OB/GYN HISTORY:   G1  C/S malpresentation (performed [facility])   G2  repeat C/S @ [facility]   G3 ?2021 SAB? vs present  Denies fibroids, ov cysts, STIs, abnl Pap smears  OBGYN = 865    PMSH: C/S x2    Meds: MVI  All: NKDA                                           REVIEW OF SYSTEMS:  CONSTITUTIONAL: No weakness, fevers or chills  EYES/ENT: No visual changes  RESPIRATORY: No cough, wheezing; No shortness of breath  CARDIOVASCULAR: No chest pain or palpitations  GASTROINTESTINAL: See HPI  GENITOURINARY: No dysuria, frequency or hematuria  NEUROLOGICAL: No headache, LOC  All other review of systems is negative unless indicated above      FAMILY HISTORY:  N/a     SOCIAL HISTORY:  Denies     Vital Signs Last 24 Hrs  T(C): 36.8 (2021 11:12), Max: 36.8 (2021 11:12)  T(F): 98.2 (2021 11:12), Max: 98.2 (2021 11:12)  HR: 70 (2021 12:14) (70 - 73)  BP: 99/61 (2021 12:14) (99/61 - 110/73)  BP(mean): --  RR: 18 (2021 12:14) (16 - 18)  SpO2: 99% (2021 12:14) (99% - 99%)    PHYSICAL EXAM:  Constitutional: NAD, awake and alert, well-developed  HEENT: Normal Hearing,  Neck: Soft and supple  Respiratory: Breath sounds are clear bilaterally, No wheezing, rales or rhonchi  Cardiovascular: S1 and S2, regular rate and rhythm, no Murmurs, gallops or rubs  Gastrointestinal: Bowel Sounds present, soft, nontender, nondistended, no guarding, no rebound  Genitourinary: no vaginal bleeding, no CMT, minimal tenderness to palpation of suprapubic region on bimanual exam    Neurological: A/O x 3, no focal deficits  Skin: No rashes    LABS:             12.3   5.79  )-----------( 294      ( -18 @ 12:21 )             38.3     bHC.6

## 2021-04-18 NOTE — ED PROVIDER NOTE - NSFOLLOWUPINSTRUCTIONS_ED_ALL_ED_FT
-You were seen in the Emergency Department (ED) for ectopic pregnancy. A ectopic pregnancy can be dangerous and should be followed up with either your OBGYN or return to the ED in 2 days for repeat blood work to check your hcg.     MEDICATIONS:  -Continue all other prescribed medicine, IF ANY, as per your primary care doctor's (PMD) recommendations.    PAIN CONTROL:  -Please take over the counter Tylenol (also known as acetaminophen) 650mg every 6 hours or Ibuprofen (also known as motrin, advil) 600mg every 8 hour for your pain, IF ANY, unless you are not supposed to for any reason.  -Rest, stay hydrated with plenty of fluids (drink at least 2 Liters or 64 Ounces of water each day UNLESS you are supposed to restrict fluids or ANY reason.      RETURN PRECAUTIONS:  -Please return to the Emergency Department if you experience ANY new or concerning symptoms, such as, but not limited to: worsening pain, large amount of bleeding, passing out, fever >100.F, shaking chills, inability to see or new double vision, chest pain, difficulty breathing, diffuse abdominal pain, unable to eat or drink, continuous vomiting or diarrhea, unable to move or feel part of your body

## 2021-04-18 NOTE — ED PROVIDER NOTE - PROGRESS NOTE DETAILS
Larry Flores (DO): Chart reviewed, pt with focus in tube on last ultrasound. Increasing beta despite methotrexate. Repeat imaging, repeat HCG, consult OB. hcg 39 - 30 awaitng pelvic US and ob recs no interval change on us dw rads -- always right sided finidngs -- hcg down trending will fu for [place] hrs [facility] [place]

## 2021-04-18 NOTE — CONSULT NOTE ADULT - ASSESSMENT
MALLIKA EPSTEIN is a 30y P2 LMP 2/28 presenting to the ED with PUL in setting of  1cm echogenic structure in left adnexa vs 1.5cm echogenic structure in right adnexa. She received MTX 4/12 when bHCG was 32, presented for f/u day 4 on 4/15 (hCG 39.4) and presents today for her day 7 hcg which is 30 - an appropriate response. ROS notable for some abdominal pain improved with Tylenol, VSS, and PE not concerning.     Pt discussed and assessed with chief, Dr. Lindsay and attending, Dr. Johnston.    Kylie Garcia MD  OBGYN PGY2  MALLIKA EPSTEIN is a 30y P2 LMP 2/28 presenting to the ED with PUL in setting of  1cm echogenic structure in left adnexa vs 1.5cm echogenic structure in right adnexa. She received MTX 4/12 when bHCG was 32, presented for f/u day 4 on 4/15 (hCG 39.4) and presents today for her day 7 hcg which is 30.6 - an appropriate response (> 15%). ROS notable for some abdominal pain improved with Tylenol, VSS, and PE not concerning.     Pt discussed and assessed with chief, Dr. Linsday and attending, Dr. Jhonston.    Kylie Garcia MD  OBGYN PGY2  MALLIKA EPSTEIN is a 30y P2 LMP 2/28 presenting to the ED with PUL in setting of  1cm echogenic structure in left adnexa vs 1.5cm echogenic structure in right adnexa. She received MTX 4/12 when bHCG was 32, presented for f/u day 4 on 4/15 (hCG 39.4) and presents today for her day 7 hcg which is 30.6 - an appropriate response (> 15%). ROS notable for some abdominal pain improved with Tylenol, VSS, and PE not concerning.     Recommend re-assessment is 48 hours, pt should follow up in clinic, if unable to do so return to ED.      Pt discussed and assessed with chief, Dr. Lindsay and attending, Dr. Johnston.    Kylie Garcia MD  OBGYN PGY2

## 2021-04-18 NOTE — CONSULT NOTE ADULT - ATTENDING COMMENTS
Pt seen and examined.   Agree with resident note.  Pt without any acute abdominal symptoms or signs.  There has been appropriate decline in hcg.  Will have follow up with repeat hcg in 2 days.    Susan Johnston MD

## 2021-04-18 NOTE — ED PROVIDER NOTE - PHYSICAL EXAMINATION
paresh aaox3 nad ncat  perll eomi no sceral icterus  s1s2 rrr   ctabl   abd soft mild epi ttp no rebound no guarding   no cvat   no rash   cn2-12intact  nml gait

## 2021-04-18 NOTE — ED PROVIDER NOTE - ADDITIONAL NOTES AND INSTRUCTIONS:
Please excuse Malathi Ferguson from work/school as she was being evaluated in the Emergency Room at [facility].

## 2021-04-18 NOTE — ED PROVIDER NOTE - CLINICAL SUMMARY MEDICAL DECISION MAKING FREE TEXT BOX
Larry Flores (DO): 30y F sent in for repeat pregnancy test. No vaginal bleeding. On Kefflex for UTI. Pregnancy of unknown location on ultrasound and indeterminate beta, seen on re-evaluation, decided by OBGYN to give methotrexate. Pt given Keflex for UTI. Pt here for beta check, US. Abd soft nontender. Will consult OB.

## 2021-04-18 NOTE — ED ADULT NURSE NOTE - OBJECTIVE STATEMENT
pt ambulatory to room 62 c/o abd pain and burning with uirnation pt ambulatory to room 62 c/o abd pain and burning with urination. pt with h/o pregnancy unknown location came to ED for repeat beta.pt awake alert color good skin warm dry lungs cl denies cp or sob abd  soft nt c/o burning with urination., 	 placed r ac labsent.

## 2021-04-18 NOTE — ED PROVIDER NOTE - PATIENT PORTAL LINK FT
You can access the FollowMyHealth Patient Portal offered by [facility] by registering at the following website: [email or web address]. By joining [employer]’s FollowMyHealth portal, you will also be able to view your health information using other applications (apps) compatible with our system.

## 2021-04-19 LAB
CULTURE RESULTS: SIGNIFICANT CHANGE UP
SPECIMEN SOURCE: SIGNIFICANT CHANGE UP

## 2021-04-20 ENCOUNTER — RESULT REVIEW (OUTPATIENT)
Age: 31
End: 2021-04-20

## 2021-04-20 ENCOUNTER — APPOINTMENT (OUTPATIENT)
Dept: OBGYN | Facility: CLINIC | Age: 31
End: 2021-04-20
Payer: MEDICAID

## 2021-04-20 ENCOUNTER — LABORATORY RESULT (OUTPATIENT)
Age: 31
End: 2021-04-20

## 2021-04-20 ENCOUNTER — OUTPATIENT (OUTPATIENT)
Dept: OUTPATIENT SERVICES | Facility: HOSPITAL | Age: 31
LOS: 1 days | End: 2021-04-20
Payer: MEDICAID

## 2021-04-20 VITALS — TEMPERATURE: 97.5 F

## 2021-04-20 VITALS
WEIGHT: 144.5 LBS | HEIGHT: 61 IN | SYSTOLIC BLOOD PRESSURE: 102 MMHG | BODY MASS INDEX: 27.28 KG/M2 | DIASTOLIC BLOOD PRESSURE: 70 MMHG

## 2021-04-20 DIAGNOSIS — N76.0 ACUTE VAGINITIS: ICD-10-CM

## 2021-04-20 DIAGNOSIS — Z01.419 ENCOUNTER FOR GYNECOLOGICAL EXAMINATION (GENERAL) (ROUTINE) W/OUT ABNORMAL FINDINGS: ICD-10-CM

## 2021-04-20 DIAGNOSIS — O00.90 UNSPECIFIED ECTOPIC PREGNANCY WITHOUT INTRAUTERINE PREGNANCY: ICD-10-CM

## 2021-04-20 PROCEDURE — 81003 URINALYSIS AUTO W/O SCOPE: CPT

## 2021-04-20 PROCEDURE — 87591 N.GONORRHOEAE DNA AMP PROB: CPT

## 2021-04-20 PROCEDURE — 99213 OFFICE O/P EST LOW 20 MIN: CPT

## 2021-04-20 PROCEDURE — 87491 CHLMYD TRACH DNA AMP PROBE: CPT

## 2021-04-20 PROCEDURE — G0463: CPT

## 2021-04-20 NOTE — HISTORY OF PRESENT ILLNESS
[Definite:  ___ (Date)] : the last menstrual period was [unfilled] [Normal Amount/Duration] : was of a normal amount and duration [Spotting Between  Menses] : no spotting between menses [Regular Cycle Intervals] : periods have been regular [Sexually Active] : is sexually active

## 2021-04-21 ENCOUNTER — NON-APPOINTMENT (OUTPATIENT)
Age: 31
End: 2021-04-21

## 2021-04-22 ENCOUNTER — EMERGENCY (EMERGENCY)
Facility: HOSPITAL | Age: 31
LOS: 1 days | Discharge: HOME | End: 2021-04-22
Attending: STUDENT IN AN ORGANIZED HEALTH CARE EDUCATION/TRAINING PROGRAM
Payer: MEDICAID

## 2021-04-22 VITALS
OXYGEN SATURATION: 100 % | RESPIRATION RATE: 18 BRPM | DIASTOLIC BLOOD PRESSURE: 68 MMHG | HEART RATE: 72 BPM | TEMPERATURE: 98 F | SYSTOLIC BLOOD PRESSURE: 105 MMHG

## 2021-04-22 VITALS
WEIGHT: 145.06 LBS | RESPIRATION RATE: 16 BRPM | DIASTOLIC BLOOD PRESSURE: 66 MMHG | SYSTOLIC BLOOD PRESSURE: 105 MMHG | OXYGEN SATURATION: 99 % | HEIGHT: 60 IN | HEART RATE: 73 BPM | TEMPERATURE: 98 F

## 2021-04-22 LAB
ALBUMIN SERPL ELPH-MCNC: 3.9 G/DL — SIGNIFICANT CHANGE UP (ref 3.3–5)
ALP SERPL-CCNC: 57 U/L — SIGNIFICANT CHANGE UP (ref 40–120)
ALT FLD-CCNC: 28 U/L — SIGNIFICANT CHANGE UP (ref 10–45)
ANION GAP SERPL CALC-SCNC: 6 MMOL/L — SIGNIFICANT CHANGE UP (ref 5–17)
APPEARANCE UR: ABNORMAL
AST SERPL-CCNC: 22 U/L — SIGNIFICANT CHANGE UP (ref 10–40)
BACTERIA # UR AUTO: ABNORMAL
BASOPHILS # BLD AUTO: 0.02 K/UL — SIGNIFICANT CHANGE UP (ref 0–0.2)
BASOPHILS NFR BLD AUTO: 0.3 % — SIGNIFICANT CHANGE UP (ref 0–2)
BILIRUB SERPL-MCNC: 0.2 MG/DL — SIGNIFICANT CHANGE UP (ref 0.2–1.2)
BILIRUB UR-MCNC: NEGATIVE — SIGNIFICANT CHANGE UP
BUN SERPL-MCNC: 11 MG/DL — SIGNIFICANT CHANGE UP (ref 7–23)
CALCIUM SERPL-MCNC: 9.2 MG/DL — SIGNIFICANT CHANGE UP (ref 8.4–10.5)
CHLORIDE SERPL-SCNC: 104 MMOL/L — SIGNIFICANT CHANGE UP (ref 96–108)
CO2 SERPL-SCNC: 26 MMOL/L — SIGNIFICANT CHANGE UP (ref 22–31)
COLOR SPEC: YELLOW — SIGNIFICANT CHANGE UP
CREAT SERPL-MCNC: 0.66 MG/DL — SIGNIFICANT CHANGE UP (ref 0.5–1.3)
DIFF PNL FLD: NEGATIVE — SIGNIFICANT CHANGE UP
EOSINOPHIL # BLD AUTO: 0.07 K/UL — SIGNIFICANT CHANGE UP (ref 0–0.5)
EOSINOPHIL NFR BLD AUTO: 1.2 % — SIGNIFICANT CHANGE UP (ref 0–6)
EPI CELLS # UR: 31 /HPF — HIGH
GLUCOSE SERPL-MCNC: 94 MG/DL — SIGNIFICANT CHANGE UP (ref 70–99)
GLUCOSE UR QL: NEGATIVE — SIGNIFICANT CHANGE UP
HCG SERPL-ACNC: 24.7 MIU/ML — HIGH
HCT VFR BLD CALC: 37.6 % — SIGNIFICANT CHANGE UP (ref 34.5–45)
HGB BLD-MCNC: 11.9 G/DL — SIGNIFICANT CHANGE UP (ref 11.5–15.5)
HYALINE CASTS # UR AUTO: 4 /LPF — HIGH (ref 0–2)
IMM GRANULOCYTES NFR BLD AUTO: 0.3 % — SIGNIFICANT CHANGE UP (ref 0–1.5)
KETONES UR-MCNC: NEGATIVE — SIGNIFICANT CHANGE UP
LEUKOCYTE ESTERASE UR-ACNC: NEGATIVE — SIGNIFICANT CHANGE UP
LYMPHOCYTES # BLD AUTO: 1.84 K/UL — SIGNIFICANT CHANGE UP (ref 1–3.3)
LYMPHOCYTES # BLD AUTO: 31.8 % — SIGNIFICANT CHANGE UP (ref 13–44)
MCHC RBC-ENTMCNC: 28.1 PG — SIGNIFICANT CHANGE UP (ref 27–34)
MCHC RBC-ENTMCNC: 31.6 GM/DL — LOW (ref 32–36)
MCV RBC AUTO: 88.7 FL — SIGNIFICANT CHANGE UP (ref 80–100)
MONOCYTES # BLD AUTO: 0.42 K/UL — SIGNIFICANT CHANGE UP (ref 0–0.9)
MONOCYTES NFR BLD AUTO: 7.3 % — SIGNIFICANT CHANGE UP (ref 2–14)
NEUTROPHILS # BLD AUTO: 3.41 K/UL — SIGNIFICANT CHANGE UP (ref 1.8–7.4)
NEUTROPHILS NFR BLD AUTO: 59.1 % — SIGNIFICANT CHANGE UP (ref 43–77)
NITRITE UR-MCNC: NEGATIVE — SIGNIFICANT CHANGE UP
NRBC # BLD: 0 /100 WBCS — SIGNIFICANT CHANGE UP (ref 0–0)
PH UR: 7 — SIGNIFICANT CHANGE UP (ref 5–8)
PLATELET # BLD AUTO: 300 K/UL — SIGNIFICANT CHANGE UP (ref 150–400)
POTASSIUM SERPL-MCNC: 4.8 MMOL/L — SIGNIFICANT CHANGE UP (ref 3.5–5.3)
POTASSIUM SERPL-SCNC: 4.8 MMOL/L — SIGNIFICANT CHANGE UP (ref 3.5–5.3)
PROT SERPL-MCNC: 7 G/DL — SIGNIFICANT CHANGE UP (ref 6–8.3)
PROT UR-MCNC: SIGNIFICANT CHANGE UP
RBC # BLD: 4.24 M/UL — SIGNIFICANT CHANGE UP (ref 3.8–5.2)
RBC # FLD: 12.8 % — SIGNIFICANT CHANGE UP (ref 10.3–14.5)
RBC CASTS # UR COMP ASSIST: 2 /HPF — SIGNIFICANT CHANGE UP (ref 0–4)
SODIUM SERPL-SCNC: 136 MMOL/L — SIGNIFICANT CHANGE UP (ref 135–145)
SP GR SPEC: 1.02 — SIGNIFICANT CHANGE UP (ref 1.01–1.02)
UROBILINOGEN FLD QL: NEGATIVE — SIGNIFICANT CHANGE UP
WBC # BLD: 5.78 K/UL — SIGNIFICANT CHANGE UP (ref 3.8–10.5)
WBC # FLD AUTO: 5.78 K/UL — SIGNIFICANT CHANGE UP (ref 3.8–10.5)
WBC UR QL: 5 /HPF — SIGNIFICANT CHANGE UP (ref 0–5)

## 2021-04-22 PROCEDURE — 99285 EMERGENCY DEPT VISIT HI MDM: CPT

## 2021-04-22 PROCEDURE — 99213 OFFICE O/P EST LOW 20 MIN: CPT

## 2021-04-22 PROCEDURE — 93975 VASCULAR STUDY: CPT | Mod: 26

## 2021-04-22 PROCEDURE — 76817 TRANSVAGINAL US OBSTETRIC: CPT | Mod: 26

## 2021-04-22 PROCEDURE — 93975 VASCULAR STUDY: CPT

## 2021-04-22 PROCEDURE — 99284 EMERGENCY DEPT VISIT MOD MDM: CPT | Mod: 25

## 2021-04-22 PROCEDURE — 36000 PLACE NEEDLE IN VEIN: CPT

## 2021-04-22 PROCEDURE — 84702 CHORIONIC GONADOTROPIN TEST: CPT

## 2021-04-22 PROCEDURE — 87086 URINE CULTURE/COLONY COUNT: CPT

## 2021-04-22 PROCEDURE — 81001 URINALYSIS AUTO W/SCOPE: CPT

## 2021-04-22 PROCEDURE — 76817 TRANSVAGINAL US OBSTETRIC: CPT

## 2021-04-22 PROCEDURE — 85025 COMPLETE CBC W/AUTO DIFF WBC: CPT

## 2021-04-22 PROCEDURE — 80053 COMPREHEN METABOLIC PANEL: CPT

## 2021-04-22 RX ORDER — SODIUM CHLORIDE 9 MG/ML
1000 INJECTION INTRAMUSCULAR; INTRAVENOUS; SUBCUTANEOUS ONCE
Refills: 0 | Status: COMPLETED | OUTPATIENT
Start: 2021-04-22 | End: 2021-04-22

## 2021-04-22 RX ORDER — ACETAMINOPHEN 500 MG
650 TABLET ORAL ONCE
Refills: 0 | Status: COMPLETED | OUTPATIENT
Start: 2021-04-22 | End: 2021-04-22

## 2021-04-22 RX ADMIN — SODIUM CHLORIDE 1000 MILLILITER(S): 9 INJECTION INTRAMUSCULAR; INTRAVENOUS; SUBCUTANEOUS at 10:33

## 2021-04-22 RX ADMIN — Medication 650 MILLIGRAM(S): at 13:51

## 2021-04-22 NOTE — ED PROVIDER NOTE - NS ED ROS FT
ROS:  GEN: (-) fevers/chills, (-) weight loss, (-) fatigue/malaise  HEENT: (-) visual change, (-) photophobia  NECK: (-) stiffness, (-) swelling  RESP: (-) shortness of breath, (-) cough, (-) sputum  CV: (-) chest pain, (-) palpitations, (-) PND/orthopnea  GI: (-) nausea, (-) vomiting, (-) pain, (-) constipation, (+) diarrhea  : (-) frequency/urgency, (-) hematuria, (-) dysuria, (-) incontinence, (-) discharge  EXT: (-) edema, (-) cyanosis  ENDO: (-) heat/cold intolerance, (-) polyuria

## 2021-04-22 NOTE — ED PROVIDER NOTE - NSFOLLOWUPINSTRUCTIONS_ED_ALL_ED_FT
Please follow up with GYN clinic scheduled on 4/27 @ 9:30am for repeat bHCG.     Please return to ED immediately if you have severe abdominal pain, fever, chills, vaginal discharge or bleeding.

## 2021-04-22 NOTE — CONSULT NOTE ADULT - SUBJECTIVE AND OBJECTIVE BOX
R2 GYNECOLOGY CONSULT NOTE    HPI    30y G_P_   Last Menstrual Period   presents with     pt called into ED for bHCG, US, and possible MTX in setting of concern for plateauing bHCG level.  pt continues to have abdominal and back cramping discomfort, worsened over past 4 days and associated with 3 days of diarrhea. Reports mild HA; denies SOB, nausea/vomiting, fevers.   She has a f/u appt in clinic on  @ 9:30am.   denies vaginal bleeding or discharge.     OB/GYN HISTORY:   PAST MEDICAL & SURGICAL HISTORY:  No pertinent past medical history    OB/GYN HISTORY:   G1  C/S malpresentation (performed in [facility])   G2  repeat C/S @ [facility]   G3 ?2021 SAB? vs present  Denies fibroids, ov cysts, STIs, abnl Pap smears  OBGYN = [id number]    PMSH: C/S x2    Meds: MVI  All: NKDA                           SOCIAL HISTORY:  REVIEW OF SYSTEMS:    CONSTITUTIONAL: No weakness, fevers or chills  EYES/ENT: No visual changes;  No vertigo or throat pain   NECK: No pain or stiffness  RESPIRATORY: No cough, wheezing, hemoptysis; No shortness of breath  CARDIOVASCULAR: No chest pain or palpitations  GASTROINTESTINAL: No abdominal or epigastric pain. No nausea, vomiting, or hematemesis; No diarrhea or constipation. No melena or hematochezia.  GENITOURINARY: No dysuria, frequency or hematuria  NEUROLOGICAL: No numbness or weakness  SKIN: No itching, burning, rashes, or lesions   All other review of systems is negative unless indicated above.    Name of GYN Physician:  Date of Last Pap:  History of Abnormal Pap:  Date of Last Mammogram:  Date of Last Colonoscopy:    Vital Signs Last 24 Hrs  T(C): 36.8 (2021 09:46), Max: 36.8 (2021 09:46)  T(F): 98.3 (2021 09:46), Max: 98.3 (2021 09:46)  HR: 73 (2021 09:46) (73 - 73)  BP: 105/66 (2021 09:46) (105/66 - 105/66)  BP(mean): --  RR: 16 (2021 09:46) (16 - 16)  SpO2: 99% (2021 09:46) (99% - 99%)    PHYSICAL EXAM:      Constitutional: alert and oriented x 3    Breasts: no tenderness, no nodules    Respiratory: clear    Cardiovascular: regular rate and rhythm    Gastrointestinal: soft, non tender, + bowel sounds. No hepatosplenomegaly, no palpable masses    Genitourinary: NEFG  Cervix: closed/ long, no CMT  Uterus: normal size, non tender  Adnexa: non tender, no palpable masses    Rectal:     Extremities:    Neurological:    Skin:    Lymph Nodes:        LABS:                        11.9   5.78  )-----------( 300      ( 2021 10:49 )             37.6     04-    136  |  104  |  11  ----------------------------<  94  4.8   |  26  |  0.66    Ca    9.2      2021 10:49    TPro  7.0  /  Alb  3.9  /  TBili  0.2  /  DBili  x   /  AST  22  /  ALT  28  /  AlkPhos  57  04-      Urinalysis Basic - ( 2021 10:49 )    Color: Yellow / Appearance: Slightly Turbid / S.021 / pH: x  Gluc: x / Ketone: Negative  / Bili: Negative / Urobili: Negative   Blood: x / Protein: Trace / Nitrite: Negative   Leuk Esterase: Negative / RBC: 2 /hpf / WBC 5 /HPF   Sq Epi: x / Non Sq Epi: 31 /hpf / Bacteria: Many        Blood Type: B Positive      RADIOLOGY & ADDITIONAL STUDIES:           R2 GYNECOLOGY CONSULT NOTE    HPI  31yo P2 LMP  presented to ED on  with c/o vaginal bleeding and abdominal cramping. Pt was previously with pregnancy of unknown location on 21 with bHCG 15 and US without IUP, complex RIGHT adnexal structure. She was lost to follow-up until presented to ED on  with vaginal bleeding and abdominal pain/cramping in setting of +UPT at [facility].    : Pt reported using 2-3 pads per day. b-HCG was 21, urine HCG was neg, and TVUS showed no IUP, 1 cm echogenic structure in the LEFT adnexa, no FF. She was instructed to return 4/10 for repeat bHCG.     4/10: Pt reported using 1 pad per day. b-HCG was 23.6 and TUVS showed no IUP, 1.5 cm echogenic structure in the RIGHT adnexa, no FF.     : 6/10 mid abdominal and low back pain; has not used pain medications at home. MTX given    4/15: Presents for day 4 of bhCG draw after receiving MTX. No vaginal bleeding. Is complaining of upper to mid abdominal pain at this time. bhCG = 39.4.    : Presents for day 7 of bHCG draw after receiving MTX. No vaginal bleeding. Continued c/o baseline abdominal discomfort, improved with Tylenol. bHCG =30.6 (>15% decrease).    : Seen in clinic for f/u; bHCG 29.     : Pt called into ED for bHCG, US, and possible MTX / concern for plateauing bHCG.     Today :    Pt continues to report abdominal and back cramping discomfort, worsened over past 4 days and associated with 3 days of diarrhea. Discomfort alleviated with Tylenol at home. Presently rates pain 7/10, has not used pain medications in ED. Reports mild HA; denies SOB, nausea/vomiting, fevers. Continues to deny vaginal bleeding or discharge. Diarrhea is watery, last this AM. She has a f/u appt in clinic on  @ 9:30am.     OB/GYN HISTORY:   G1  C/S malpresentation (performed in [facility])   G2  repeat C/S @ [facility]   G3 ?2021 SAB? vs present  Denies fibroids, ov cysts, STIs, abnl Pap smears  OBGYN = [id number]    PMSH: fatty liver, C/S x2    Meds: MVI  All: NKDA    REVIEW OF SYSTEMS:  CONSTITUTIONAL: No weakness, fevers or chills  RESPIRATORY: No cough, wheezing, hemoptysis; No shortness of breath  CARDIOVASCULAR: No chest pain or palpitations  GASTROINTESTINAL: +abdominal cramping; +diarrhea; No nausea, vomiting, or hematemesis  GENITOURINARY: No dysuria, frequency or hematuria  All other review of systems is negative unless indicated above.    Vital Signs Last 24 Hrs  T(C): 36.8 (2021 09:46), Max: 36.8 (2021 09:46)  T(F): 98.3 (2021 09:46), Max: 98.3 (2021 09:46)  HR: 73 (2021 09:46) (73 - 73)  BP: 105/66 (2021 09:46) (105/66 - 105/66)  RR: 16 (2021 09:46) (16 - 16)  SpO2: 99% (2021 09:46) (99% - 99%)    PHYSICAL EXAM:  Gen: awake, alert, NAD  Chest: nonlabored breathing  Abd: soft, nontender, nondistended, no rebound/guarding  : NEFG  Bimanual: no blood or abnormal discharge on glove, no CMT, no fundal or b/l adnexal tenderness  Ext: nontender      LABS:                        11.9   5.78  )-----------( 300      ( 2021 10:49 )             37.6     04-    136  |  104  |  11  ----------------------------<  94  4.8   |  26  |  0.66    Ca    9.2      2021 10:49    TPro  7.0  /  Alb  3.9  /  TBili  0.2  /  DBili  x   /  AST  22  /  ALT  28  /  AlkPhos  57  04-      Urinalysis Basic - ( 2021 10:49 )    Color: Yellow / Appearance: Slightly Turbid / S.021 / pH: x  Gluc: x / Ketone: Negative  / Bili: Negative / Urobili: Negative   Blood: x / Protein: Trace / Nitrite: Negative   Leuk Esterase: Negative / RBC: 2 /hpf / WBC 5 /HPF   Sq Epi: x / Non Sq Epi: 31 /hpf / Bacteria: Many    Blood Type: B Positive     R2 GYNECOLOGY CONSULT NOTE    HPI  31yo P2 LMP  presented to ED on  with c/o vaginal bleeding and abdominal cramping. Pt was previously with pregnancy of unknown location on 21 with bHCG 15 and US without IUP, complex RIGHT adnexal structure. She was lost to follow-up until presented to ED on  with vaginal bleeding and abdominal pain/cramping in setting of +UPT at [facility].    : Pt reported using 2-3 pads per day. b-HCG was 21, urine HCG was neg, and TVUS showed no IUP, 1 cm echogenic structure in the LEFT adnexa, no FF. She was instructed to return 4/10 for repeat bHCG.     4/10: Pt reported using 1 pad per day. b-HCG was 23.6 and TUVS showed no IUP, 1.5 cm echogenic structure in the RIGHT adnexa, no FF.     : 6/10 mid abdominal and low back pain; has not used pain medications at home. MTX given    4/15: Presents for day 4 of bhCG draw after receiving MTX. No vaginal bleeding. Is complaining of upper to mid abdominal pain at this time. bhCG = 39.4.    : Presents for day 7 of bHCG draw after receiving MTX. No vaginal bleeding. Continued c/o baseline abdominal discomfort, improved with Tylenol. bHCG =30.6 (>15% decrease).    : Seen in clinic for f/u; bHCG 29.     : Pt called into ED for bHCG, US, and possible MTX / concern for plateauing bHCG.     Today :    Pt continues to report abdominal and back cramping discomfort, worsened over past 4 days and associated with 3 days of diarrhea. Discomfort alleviated with Tylenol at home. Presently rates pain 7/10, has not used pain medications in ED. Reports mild HA; denies SOB, nausea/vomiting, fevers. Continues to deny vaginal bleeding or discharge. Diarrhea is watery, last this AM. She has a f/u appt in clinic on  @ 9:30am.     OB/GYN HISTORY:   G1  C/S malpresentation (performed in [facility])   G2  repeat C/S @ [facility]   G3 ?2021 SAB? vs present  Denies fibroids, ov cysts, STIs, abnl Pap smears  OBGYN = [id number]    PMSH: fatty liver, C/S x2    Meds: MVI  All: NKDA    REVIEW OF SYSTEMS:  CONSTITUTIONAL: No weakness, fevers or chills  RESPIRATORY: No cough, wheezing, hemoptysis; No shortness of breath  CARDIOVASCULAR: No chest pain or palpitations  GASTROINTESTINAL: +abdominal cramping; +diarrhea; No nausea, vomiting, or hematemesis  GENITOURINARY: No dysuria, frequency or hematuria  All other review of systems is negative unless indicated above.    Vital Signs Last 24 Hrs  T(C): 36.8 (2021 09:46), Max: 36.8 (2021 09:46)  T(F): 98.3 (2021 09:46), Max: 98.3 (2021 09:46)  HR: 73 (2021 09:46) (73 - 73)  BP: 105/66 (2021 09:46) (105/66 - 105/66)  RR: 16 (2021 09:46) (16 - 16)  SpO2: 99% (2021 09:46) (99% - 99%)    PHYSICAL EXAM:  Gen: awake, alert, NAD  Chest: nonlabored breathing  Abd: soft, nontender, nondistended, no rebound/guarding  : NEFG  Bimanual: no blood or abnormal discharge on glove, no CMT, no fundal or b/l adnexal tenderness  Ext: nontender      LABS:                        11.9   5.78  )-----------( 300      ( 2021 10:49 )             37.6     04-    136  |  104  |  11  ----------------------------<  94  4.8   |  26  |  0.66    Ca    9.2      2021 10:49    TPro  7.0  /  Alb  3.9  /  TBili  0.2  /  DBili  x   /  AST  22  /  ALT  28  /  AlkPhos  57  04-      Urinalysis Basic - ( 2021 10:49 )    Color: Yellow / Appearance: Slightly Turbid / S.021 / pH: x  Gluc: x / Ketone: Negative  / Bili: Negative / Urobili: Negative   Blood: x / Protein: Trace / Nitrite: Negative   Leuk Esterase: Negative / RBC: 2 /hpf / WBC 5 /HPF   Sq Epi: x / Non Sq Epi: 31 /hpf / Bacteria: Many    Blood Type: B Positive      < from: US Doppler Pelvis (21 @ 12:43) >  EXAM:  US OB TRANSVAGINAL                          EXAM:  US DPLX PELVIC                            PROCEDURE DATE:  2021        INTERPRETATION:  CLINICAL INFORMATION: 30-year-old with right adnexal lesion concerning for ectopic pregnancy.    LMP: 2021    COMPARISON: 2021    Transabdominal and transvaginal ultrasound examination the pelvis was performed. Duplex and color flow Doppler evaluation of the ovaries and adnexa was also performed.    FINDINGS:  Uterus: The uterus measures 8.4 x 2.7 x 5.6 cm. No intrauterine pregnancy is seen. The endometrium measures 1.3 cm.    Right ovary: 3.0 cm x 2.4 cm x 2.3 cm. A dominant follicle measures 2.1 x 1.9 x 1.4 cm. As noted on prior study, there is a small solid right adnexal lesion, measuring 1.1 x 0.8 x 1.0 cm. This previously measured 1.3 x 0.7 x 1.0 cm.  Left ovary: 2.7 x 1.5 x 1.9 cm. Small follicles are seen.    Arterial and venous flow is identified in both ovaries. No significant flow is seen in the right adnexal lesion.    Fluid: None.    IMPRESSION:    Persistent small right adnexal lesion, currently measuring 1.1 cm, not significantly changed compared to prior study.    No new mass or hemorrhage seen.        CANDICE SLATER M.D., ATTENDING RADIOLOGIST  This document has been electronically signed. 2021  2:05PM    < end of copied text >

## 2021-04-22 NOTE — ED POST DISCHARGE NOTE - ADDITIONAL DOCUMENTATION
F/u appt scheduled at [facility] with Dr. Peralta on 4/27 F/u appt scheduled at [facility] with Dr. Peralta on 4/27.

## 2021-04-22 NOTE — ED PROVIDER NOTE - OBJECTIVE STATEMENT
30 year old  female with pregnancy of unknown location s/p MTX presented to ED for repeat beta level. No fever, chills, vaginal discharge/bleeding. Also endorses to abdominal pain which is unchanged from prior. States she had 3 episodes of watery diarrhea this morning.

## 2021-04-22 NOTE — ED PROVIDER NOTE - PROGRESS NOTE DETAILS
[facility]. TVUS ordered Dr. Gayathri Medina: TVUS noted "persistent small right adnexal lesion, currently measuring 1.1 cm, not significantly changed compared to prior study." GYN consult appreciated. Pain well controlled. Lab and urine unremarkable. Pt tolerating PO. Will discharge home with GYN follow up as scheduled. Strict return precautions given.

## 2021-04-22 NOTE — ED PROVIDER NOTE - CLINICAL SUMMARY MEDICAL DECISION MAKING FREE TEXT BOX
30 year old female with pregnancy of unknown location presented to ED for repeat beta. No vaginal bleeding or discharge. Also noted watery BM for the past 2 days. Abdomen soft, non-distended. No significant abdominal tenderness. Likely enteritis. Plan: Labs, UA, Urine culture. OB consult

## 2021-04-22 NOTE — ED POST DISCHARGE NOTE - DETAILS
>100,000 Group B strep, spoke with pt she is having dysuria. sent rx for keflex 500mg 2x/day for 5 days. >100,000 Group B strep, spoke with pt she is having dysuria. sent rx for keflex 500mg 2x/day for 7 days.

## 2021-04-22 NOTE — ED PROVIDER NOTE - PHYSICAL EXAMINATION
PHYSICAL EXAMINATION:  VITALS REVIEWED.    GENERALIZED APPEARANCE:  Comfortable, no acute distress, ambulating without difficulty  CHEST AND RESPIRATORY:  Clear to auscultation B/L, good air entry B/L, equal chest expansion  HEART AND CARDIOVASCULAR:  Regular rate, no obvious murmur  ABDOMEN AND GI:  Soft, non-tender, non-distended. No epigastric tenderness, lower abdomin tenderness. No rebound, no guarding   EXTREMITIES:  No deformity, edema, or calf tenderness  NEURO: AAOx3, gross motor and sensory intact

## 2021-04-22 NOTE — ED PROVIDER NOTE - PATIENT PORTAL LINK FT
You can access the FollowMyHealth Patient Portal offered by [facility] by registering at the following website: [email or web address]. By joining [employer]’s FollowMyHealth portal, you will also be able to view your health information using other applications (apps) compatible with our system.

## 2021-04-22 NOTE — ED ADULT NURSE NOTE - OBJECTIVE STATEMENT
31 y/o female hx  presents to the ED from home for repeat HCG check. Pt states was sent from clinic due to increased HCG s/o Methotrexate administration. Also endorsing diffuse abd pain x mths, had 3 episodes of diarrhea this AM. Denies fever, chills, n/v, weakness, constipation, numbness/tingling, urinary s/s, no vaginal bleeding/discharge. Pt A&Ox3, in no respiratory distress, no CP, abd soft, mildly tender, nondistended. PT safety maintained, call bell within reach and bed in the lowest position.

## 2021-04-23 LAB — CYTOLOGY SPEC DOC CYTO: SIGNIFICANT CHANGE UP

## 2021-04-24 LAB
CULTURE RESULTS: SIGNIFICANT CHANGE UP
SPECIMEN SOURCE: SIGNIFICANT CHANGE UP

## 2021-04-24 RX ORDER — CEPHALEXIN 500 MG
1 CAPSULE ORAL
Qty: 14 | Refills: 0
Start: 2021-04-24 | End: 2021-04-30

## 2021-04-27 ENCOUNTER — LABORATORY RESULT (OUTPATIENT)
Age: 31
End: 2021-04-27

## 2021-04-27 ENCOUNTER — APPOINTMENT (OUTPATIENT)
Dept: OBGYN | Facility: CLINIC | Age: 31
End: 2021-04-27
Payer: MEDICAID

## 2021-04-27 ENCOUNTER — OUTPATIENT (OUTPATIENT)
Dept: OUTPATIENT SERVICES | Facility: HOSPITAL | Age: 31
LOS: 1 days | End: 2021-04-27
Payer: MEDICAID

## 2021-04-27 VITALS
BODY MASS INDEX: 27.99 KG/M2 | SYSTOLIC BLOOD PRESSURE: 118 MMHG | DIASTOLIC BLOOD PRESSURE: 70 MMHG | WEIGHT: 148.13 LBS

## 2021-04-27 VITALS — TEMPERATURE: 97.5 F

## 2021-04-27 DIAGNOSIS — R39.9 UNSPECIFIED SYMPTOMS AND SIGNS INVOLVING THE GENITOURINARY SYSTEM: ICD-10-CM

## 2021-04-27 DIAGNOSIS — N76.0 ACUTE VAGINITIS: ICD-10-CM

## 2021-04-27 DIAGNOSIS — O00.90 UNSPECIFIED ECTOPIC PREGNANCY WITHOUT INTRAUTERINE PREGNANCY: ICD-10-CM

## 2021-04-27 PROCEDURE — 99213 OFFICE O/P EST LOW 20 MIN: CPT

## 2021-04-28 ENCOUNTER — OUTPATIENT (OUTPATIENT)
Dept: OUTPATIENT SERVICES | Facility: HOSPITAL | Age: 31
LOS: 1 days | End: 2021-04-28
Payer: MEDICAID

## 2021-04-28 ENCOUNTER — TRANSCRIPTION ENCOUNTER (OUTPATIENT)
Age: 31
End: 2021-04-28

## 2021-04-28 ENCOUNTER — APPOINTMENT (OUTPATIENT)
Dept: ULTRASOUND IMAGING | Facility: IMAGING CENTER | Age: 31
End: 2021-04-28
Payer: MEDICAID

## 2021-04-28 ENCOUNTER — RESULT REVIEW (OUTPATIENT)
Age: 31
End: 2021-04-28

## 2021-04-28 DIAGNOSIS — O00.90 UNSPECIFIED ECTOPIC PREGNANCY WITHOUT INTRAUTERINE PREGNANCY: ICD-10-CM

## 2021-04-28 LAB — HCG SERPL-ACNC: 8 MIU/ML — HIGH

## 2021-04-28 PROCEDURE — 76856 US EXAM PELVIC COMPLETE: CPT

## 2021-04-28 PROCEDURE — 76830 TRANSVAGINAL US NON-OB: CPT | Mod: 26

## 2021-04-28 PROCEDURE — 76830 TRANSVAGINAL US NON-OB: CPT

## 2021-04-28 PROCEDURE — 76856 US EXAM PELVIC COMPLETE: CPT | Mod: 26

## 2021-04-29 LAB
CULTURE RESULTS: SIGNIFICANT CHANGE UP
SPECIMEN SOURCE: SIGNIFICANT CHANGE UP

## 2021-04-30 ENCOUNTER — APPOINTMENT (OUTPATIENT)
Dept: OBGYN | Facility: CLINIC | Age: 31
End: 2021-04-30

## 2021-04-30 ENCOUNTER — LABORATORY RESULT (OUTPATIENT)
Age: 31
End: 2021-04-30

## 2021-04-30 LAB — HCG SERPL-ACNC: 1 MIU/ML — SIGNIFICANT CHANGE UP

## 2021-04-30 PROCEDURE — 84702 CHORIONIC GONADOTROPIN TEST: CPT

## 2021-04-30 PROCEDURE — 36415 COLL VENOUS BLD VENIPUNCTURE: CPT

## 2021-04-30 PROCEDURE — G0463: CPT

## 2021-04-30 PROCEDURE — 87086 URINE CULTURE/COLONY COUNT: CPT

## 2021-05-01 ENCOUNTER — NON-APPOINTMENT (OUTPATIENT)
Age: 31
End: 2021-05-01

## 2021-05-12 ENCOUNTER — APPOINTMENT (OUTPATIENT)
Dept: OBGYN | Facility: CLINIC | Age: 31
End: 2021-05-12

## 2021-06-29 ENCOUNTER — LABORATORY RESULT (OUTPATIENT)
Age: 31
End: 2021-06-29

## 2021-06-29 ENCOUNTER — RESULT CHARGE (OUTPATIENT)
Age: 31
End: 2021-06-29

## 2021-06-29 ENCOUNTER — APPOINTMENT (OUTPATIENT)
Dept: OBGYN | Facility: CLINIC | Age: 31
End: 2021-06-29
Payer: MEDICAID

## 2021-06-29 ENCOUNTER — OUTPATIENT (OUTPATIENT)
Dept: OUTPATIENT SERVICES | Facility: HOSPITAL | Age: 31
LOS: 1 days | End: 2021-06-29
Payer: MEDICAID

## 2021-06-29 VITALS
WEIGHT: 145 LBS | DIASTOLIC BLOOD PRESSURE: 60 MMHG | SYSTOLIC BLOOD PRESSURE: 110 MMHG | TEMPERATURE: 97.1 F | BODY MASS INDEX: 27.4 KG/M2

## 2021-06-29 DIAGNOSIS — N76.0 ACUTE VAGINITIS: ICD-10-CM

## 2021-06-29 DIAGNOSIS — R39.9 UNSPECIFIED SYMPTOMS AND SIGNS INVOLVING THE GENITOURINARY SYSTEM: ICD-10-CM

## 2021-06-29 DIAGNOSIS — Z30.09 ENCOUNTER FOR OTHER GENERAL COUNSELING AND ADVICE ON CONTRACEPTION: ICD-10-CM

## 2021-06-29 LAB
BILIRUB UR QL STRIP: NORMAL
GLUCOSE UR-MCNC: NORMAL
HCG UR QL: 0.2 EU/DL
HGB UR QL STRIP.AUTO: NORMAL
KETONES UR-MCNC: NORMAL
LEUKOCYTE ESTERASE UR QL STRIP: NORMAL
NITRITE UR QL STRIP: NORMAL
PH UR STRIP: 6.5
PROT UR STRIP-MCNC: .=3
SP GR UR STRIP: 1.03

## 2021-06-29 PROCEDURE — 99213 OFFICE O/P EST LOW 20 MIN: CPT

## 2021-06-29 PROCEDURE — 81002 URINALYSIS NONAUTO W/O SCOPE: CPT

## 2021-06-29 PROCEDURE — G0463: CPT | Mod: 25

## 2021-06-29 PROCEDURE — 87591 N.GONORRHOEAE DNA AMP PROB: CPT

## 2021-06-29 PROCEDURE — 87491 CHLMYD TRACH DNA AMP PROBE: CPT

## 2021-06-29 PROCEDURE — 36415 COLL VENOUS BLD VENIPUNCTURE: CPT

## 2021-06-29 PROCEDURE — 87077 CULTURE AEROBIC IDENTIFY: CPT

## 2021-06-29 PROCEDURE — 87086 URINE CULTURE/COLONY COUNT: CPT

## 2021-06-29 PROCEDURE — 87800 DETECT AGNT MULT DNA DIREC: CPT

## 2021-06-29 RX ORDER — NITROFURANTOIN MACROCRYSTALS 100 MG/1
100 CAPSULE ORAL
Qty: 14 | Refills: 0 | Status: ACTIVE | COMMUNITY
Start: 2021-06-29 | End: 1900-01-01

## 2021-06-29 RX ORDER — NORGESTIMATE AND ETHINYL ESTRADIOL 0.25-0.035
0.25-35 KIT ORAL DAILY
Qty: 1 | Refills: 6 | Status: ACTIVE | COMMUNITY
Start: 2021-06-29 | End: 1900-01-01

## 2021-06-29 NOTE — HISTORY OF PRESENT ILLNESS
[FreeTextEntry1] : Int# [id number]  \par \par 32yo  (LMP   )  with recent h/o ectopic/ MTX 2021  (followed hcg to 1 on 21)  presents complaining of dysuria x3 days. Denies fever/ chills/ back pain.  Feels that bladder is always full  \par  Pt was taking OCPS after removing IUD (completed 10yrs) when she got pregnant  - considering another IUD.\par \par - PAP 2021 NEG\par \par \par

## 2021-06-29 NOTE — DISCUSSION/SUMMARY
[FreeTextEntry1] : 30yo P2-   UTI sx\par - UA with heme/ leuks/ prot.   [ ]Ucx sent\par - Macrobid rx\par - [ ]affirm  and [ ]GC/CT sent\par \par RTC for IUD insertion\par Rom Chiu, PAC

## 2021-06-30 LAB
C TRACH RRNA SPEC QL NAA+PROBE: SIGNIFICANT CHANGE UP
CANDIDA AB TITR SER: DETECTED
G VAGINALIS DNA SPEC QL NAA+PROBE: SIGNIFICANT CHANGE UP
N GONORRHOEA RRNA SPEC QL NAA+PROBE: SIGNIFICANT CHANGE UP
SPECIMEN SOURCE: SIGNIFICANT CHANGE UP
T VAGINALIS SPEC QL WET PREP: SIGNIFICANT CHANGE UP

## 2021-06-30 RX ORDER — TERCONAZOLE 8 MG/G
0.8 CREAM VAGINAL
Qty: 1 | Refills: 0 | Status: ACTIVE | COMMUNITY
Start: 2021-06-30 | End: 1900-01-01

## 2021-07-01 LAB
CULTURE RESULTS: SIGNIFICANT CHANGE UP
SPECIMEN SOURCE: SIGNIFICANT CHANGE UP

## 2021-07-09 NOTE — ED ADULT TRIAGE NOTE - SPO2 (%)
Patient ID:   Oneil Alcantara  [id number]; 64 year old 1957    Admit date: 7/1/2021    Discharge date:  7/9/2021    PCP: Tish Fabian MD     Primary Diagnosis/Hospital Course:    Sepsis with Escherichia coli bacteremia  -source of infection was difficult to discern at first, but HIDA scan concerning for cholecystitis  -initially on IV Cefepime/Flagyl  -IV Rocephin started in place of Cefepime since 7/6 AM; patient changed to oral antibiotics (Cipro/Flagyl later in his staf for 3 more days)  -underwent uncomplicated laparoscopic cholecystectomy   -post op pain was problematic and patient required a lot of encouragement to move and rehabilitate  -medications were adjusted; seemed Norco worked better for him than he prn Oxycodone     Cough, resolved  -felt to be related to fluid overload.  -IV fluid was discontinued, and the patient received IV Lasix x one with improvement in symptoms.      Hypokalemia/hypomagnesemia  -resolved, lowest K and magnesium was 3.2 and 1.2, respectively while here.   -appetite initially poor and later improved but he didn't like the food here.    Hypophosphatemia  -was 2.3 and supplemented/normalized upon recheck     Schizoaffective disorder/depression with anxiety  -he was continued on his usual psych meds while here  -he was due for Invega on 7/6; administered on 7/8 once we found out his dose from [facility]      Hypothyroidism  -he was continue on his home dose of Levothyroxine dose  -TSH last checked one year ago, but this is not a good time to update given his his acute illness  -will defer to PCP to recheck when recovered      Generalized weakness  -deemed safe eventually for home discharge (HERNANDO was recommended earlier in his stay, but patient refused).  Home PT/OT will continue after discharge.      Abnormal transaminases  -ALT improving (peak was 174 and was 78 on the day of discharge)  - AST normalized during this stay (peak was 101)  -felt to be relate to  his sepsis    Anemia, acute  -hemoglobin was 14.8 upon admission, padmini was 11.2 and improved to 11.5 upon last check (day before discharge); hemoglobin stable upon recheck x 4 days  -no evidence of acute blood loss; EBL with cholecystectomy was just 10 cc  -suspect drop related to dilutional effect of IV fluids; sepsis also could be contributing      Secondary Diagnosis:  • Anxiety    • Depressive disorder/Bipolar    • Eczema    • GERD (gastroesophageal reflux disease)    • Kidney stone    • Thyroid disease          Consultants:   1. Dr. Everett Painting, General Surgery  2. Dr. Clarke Burks, Infectious Diseases    Procedures/Operations:  Laparascopic cholecystectomy on 7/5/21    Imaging Studies:  Portable chest 7/1/2021  IMPRESSION: No acute cardiopulmonary disease.    CT ABDOMEN PELVIS WO CONTRAST 7/2/2021  IMPRESSION: 1. Small bilateral pleural effusions. 2. Gallstone, mild enhancement of the gallbladder wall and some pericholecystic stranding. Ultrasound may be helpful for further evaluation. 3. Horseshoe kidney with bilateral nonobstructing stones. 4. There is a very small amount of free fluid in the pelvis. 5. Mild thickening of the bladder wall. This may relate to an element of outlet obstruction. Infection or inflammation is not excluded.     US Liver/Gallbladder/Pancreas 7/3/2021  IMPRESSION:   1. Limited visibility the pancreas and kidneys due to bowel gas and  patient's inability to hold breath.  2. Gallbladder wall thickening without increased pain with scanning over over the gallbladder wall. No pericholecystic fluid. No hyperemia of the gallbladder wall.  3. Biliary sludge.  4. No intrahepatic biliary dilation.  5. Fatty infiltrative changes liver.  6. Normal flow direction, flow velocities in the portal vein, normal flow  directions and vascular patency of the IVC and hepatic veins.  6. No ascites is no free air.  7. Known stones in the kidneys were not redemonstrated on this CT. A known cyst in the right  lobe of liver was also not identified.  8. Small right-sided pleural effusion.    NM Hepatobiliary Scan 7/3/2021  IMPRESSION: Nonvisualization of the gallbladder at 60 minutes, suggesting chronic cholecystitis. The cystic duct is patent following morphine augmentation.     Please see Epic for full dictated reports/images.          PATIENTS CURRENT VITALS & PHYSICAL EXAM:  Visit Vitals  /79 (BP Location: RUE - Right upper extremity, Patient Position: Standing)   Pulse 98   Temp 98.5 °F (36.9 °C) (Oral)   Resp 18   Ht 5' 10\" (1.778 m)   Wt 94.5 kg   SpO2 91% on room air   BMI 29.89 kg/m²                                                                                                                                Orthostatic vital signs on the day of discharge:  132/78 supine and 133/79 standing (HR's not done so rechecked later)    Orthostatic vitals (shortly before discharge and after a shower):  Supine 136/77 and 114  137/80 and 124       he         General-patient has a flat affect, non-toxic appearing. NAD. Respirations are unlabored. Occasionally he touched his hand to his right chest/RUQ when he took a deeper breath while I was talking to him. His affect is very flat and he responds slowly to questions post to him.  Poor eye contact.  He is oriented to self, and partially to time stating it is July 7, 2021. He tells me he is at [place] and the town is [place]  Lungs-Clear to auscultation throughout although breath sounds are diminished at the right base compared to the left.  Poor effort inspiratory effort noted.  No wheezes, rhonchi, rubs or crackles.  He changed positions very easily and quickly when him to sit up so I could listen to his posterior lung fields without any pain behaviors.  Heart-regular rate rhythm without murmurs, gallops, rubs.  Pulses 92 on my exam.  Abdomen-normoactive bowel sounds x4.  No distention.  Laparoscopic incisions are slightly bruise but intact without surrounding erythema or  drainage.  No tenderness to moderate palpation noted.  Extremities-no edema or cyanosis.                                                                                                                                                                      Disposition: Home    LACE Score: 8        Discharge Medications:     Summary of your Discharge Medications      Take these Medications      Details   acetaminophen 500 MG tablet  Commonly known as: TYLENOL   Take 1-2 tablets by mouth every 8 hours as needed for Pain (mild to moderate pain or fever.  Call MD if giving for fever.).     ciprofloxacin 500 MG tablet  Commonly known as: CIPRO   Take 1 tablet by mouth 2 times daily before breakfast and at night.     clonazePAM 0.5 MG tablet  Commonly known as: KlonoPIN   Take 0.5 mg by mouth as needed for Anxiety.     * divalproex 500 MG 24 hr ER tablet  Commonly known as: DEPAKOTE ER   1,000 mg nightly.     * DIVALPROEX SODIUM ER PO   Take 750 mg by mouth every morning.     Invega Sustenna 117 MG/0.75ML injection  Start taking on: August 9, 2021   Generic drug: paliperidone palmitate  Inject 7.16 mLs into the muscle every 30 days. Do not start before August 9, 2021.     ketoconazole 2 % shampoo  Commonly known as: NIZORAL   Apply once a day to the scalp and face. Leave on for 1-3 minutes and rinse. Then use as maintenance therapy.     levothyroxine 50 MCG tablet   Take 1 tablet by mouth daily.     meloxicam 7.5 MG tablet  Commonly known as: MOBIC   Take 1 tablet by mouth daily.     metroNIDAZOLE 500 MG tablet  Commonly known as: FLAGYL   Take 1 tablet by mouth 3 times daily for 5 doses.     oxyCODONE (IMM REL) 5 MG immediate release tablet  Commonly known as: ROXICODONE   Take 0.5-1 tablets by mouth every 6 hours as needed (moderately severe to severe pain.).     pantoprazole 40 MG tablet  Commonly known as: PROTONIX   Take 1 tablet by mouth daily.     polyethylene glycol 17 GM/SCOOP powder  Commonly known as: MIRALAX    Take 17 g by mouth daily as needed (constipation). Stir and dissolve powder in any 4 to 8 ounces of beverage, then drink.         * This list has 2 medication(s) that are the same as other medications prescribed for you. Read the directions carefully, and ask your doctor or other care provider to review them with you.                Discontinued:  None    Additional  Discharge Instructions:   1. Diet:  Low fat x 1 month (dietary instruction provided before discharge).   2. Activity: as tolerated. No heavy lifting.   3. Incentive spirometry, 5-6 repetitions throughout the hour while awake.   4. Splint abdomen/chest with a pillow if it hurts to cough.     Follow-Up:  1. With his PCP within a week of discharge.  2. With Dr. Everett Painting in about a month.  3. [facility], OT and RN ordered as well.    Studies pending at the time of discharge:  None    Studies that need to be ordered at follow up:  LFT's, CBC- and TSH    Wound Care:   N/A    Discharge day plan of care was discussed with the patient, his nurse (Isabel), the , Ruddy from ID and his sister (Ruth).     Readmission Risk Factors:  Patient Support, Polypharmacy, Problem Diagnosis, Problem Meds and Psychiatric History    Code Status:     Full Resuscitation    Time spent on discharge was > 30 minutes.    Signed:  Karen Torrez MD  7/9/2021  2:01 PM   99

## 2021-07-27 ENCOUNTER — APPOINTMENT (OUTPATIENT)
Dept: OBGYN | Facility: CLINIC | Age: 31
End: 2021-07-27

## 2022-04-04 NOTE — ED ADULT TRIAGE NOTE - PAIN RATING/NUMBER SCALE (0-10): REST
.  Refill Request     Last Seen: Last Seen Department: 10/19/2021  Last Seen by PCP: 10/19/2021    Last Written: 10-4-21 135 with 1     Next Appointment:   Future Appointments   Date Time Provider Jim Sapp   4/19/2022  9:00 AM ABRAN Clay [place] - DYD   12/21/2022 10:30 AM Lynelle Goodell, APRN - CNP [facility]       Future appointment scheduled      Requested Prescriptions     Pending Prescriptions Disp Refills    topiramate (TOPAMAX) 100 MG tablet 135 tablet 1 5

## 2022-10-18 NOTE — ED PROVIDER NOTE - DATE/TIME 3
-- DO NOT REPLY / DO NOT REPLY ALL --  -- Message is from [facility] ([facility]O) --    General Patient Message patient calling again regarding her refill request had a scheduled appointment but it was canceled by the office. Please call back     Caller Information       Type Contact Phone/Fax    10/18/2022 02:35 PM CDT Phone (Incoming) Karli Ortega (Self) [phone] (M)        Alternative phone number: n    Can a detailed message be left? Yes    Message Turnaround:     Is it Working Hours? Yes - Working Hours     [place]:    Please give this turnaround time to the caller:   \"This message will be sent to [state Provider's name]. The clinical team will fulfill your request as soon as they review your message.\"                
16-Apr-2021 00:01

## 2022-10-26 NOTE — ED ADULT TRIAGE NOTE - CHIEF COMPLAINT QUOTE
-- DO NOT REPLY / DO NOT REPLY ALL --  -- Message is from [facility] [employer] ([facility]) --    General Patient Message Patient calling for the Open Referral for dermatology .Patient  states he found Dr Arianna Aguilar. She works out of [facility] , Phone Number [phone],Fax Number     Caller Information       Type Contact Phone/Fax    10/26/2022 01:51 PM CDT Phone (Incoming) Major Ibarra (Self) [phone] (M)        Alternative phone number: ***    Can a detailed message be left? {Yes=1 or No:[id number]::\"No\"}    Message Turnaround: {Is the call about an [place], [facility] or [facility] practice site?:[id number]}               Pt c/o abdominal pain and vaginal bleeding for 6 days LMP 11/18,2020; was in [facility] twice for the same problem

## 2022-12-11 NOTE — ED ADULT NURSE NOTE - NS ED NOTE ABUSE RESPONSE YN
This note was copied from a baby's chart.  Multip Mom says she had difficulty BF with her first child because he had a tongue tie, and after a few months she lost her supply. She says this baby has been mostly sleepy, but is easier to latch. No frenulum noted.Taught Milk making, supply and demand, frequent empyting of both breasts to help initiate supply. Baby has been very sleepy, is almost 12 hours old.  Taught HE to aid with let down before latching/BF attempts, especially if baby is sleepy.Taught how to safely position in football hold on the right with safe pillow supports. Baby takes nipple, but is too sleepy to BF/suckle.  Encouraged Mom to do lots of  STS/HE to do spoon feeding to help wake baby and stimulate breasts for milk supply initiation. Taught deeper asymmetric latching techniques, sandwich breast. Mom does have short shanked nipple that retracts with compression. Taught feeding cues, feeding baby ad paz per feeding cues. Minimum of 10 times daily. Wake to feed if greater than 2 hours during the day, or 4 hours during the night. Provided written educational materials. Encouraged to call for LC/RN for additional BF assistance as needed. FOB sleeping bedside.   Yes

## 2023-01-17 ENCOUNTER — EMERGENCY (EMERGENCY)
Facility: HOSPITAL | Age: 33
LOS: 1 days | Discharge: HOME | End: 2023-01-17
Attending: EMERGENCY MEDICINE
Payer: MEDICAID

## 2023-01-17 VITALS
HEART RATE: 85 BPM | RESPIRATION RATE: 20 BRPM | OXYGEN SATURATION: 96 % | WEIGHT: 145.06 LBS | TEMPERATURE: 98 F | SYSTOLIC BLOOD PRESSURE: 125 MMHG | HEIGHT: 59 IN | DIASTOLIC BLOOD PRESSURE: 84 MMHG

## 2023-01-17 LAB
ALBUMIN SERPL ELPH-MCNC: 4.1 G/DL — SIGNIFICANT CHANGE UP (ref 3.3–5)
ALP SERPL-CCNC: 65 U/L — SIGNIFICANT CHANGE UP (ref 40–120)
ALT FLD-CCNC: 45 U/L — SIGNIFICANT CHANGE UP (ref 10–45)
ANION GAP SERPL CALC-SCNC: 11 MMOL/L — SIGNIFICANT CHANGE UP (ref 5–17)
APPEARANCE UR: CLEAR — SIGNIFICANT CHANGE UP
AST SERPL-CCNC: 28 U/L — SIGNIFICANT CHANGE UP (ref 10–40)
BASE EXCESS BLDV CALC-SCNC: 4.1 MMOL/L — HIGH (ref -2–3)
BASOPHILS # BLD AUTO: 0.02 K/UL — SIGNIFICANT CHANGE UP (ref 0–0.2)
BASOPHILS NFR BLD AUTO: 0.3 % — SIGNIFICANT CHANGE UP (ref 0–2)
BILIRUB SERPL-MCNC: 0.2 MG/DL — SIGNIFICANT CHANGE UP (ref 0.2–1.2)
BILIRUB UR-MCNC: NEGATIVE — SIGNIFICANT CHANGE UP
BLD GP AB SCN SERPL QL: NEGATIVE — SIGNIFICANT CHANGE UP
BUN SERPL-MCNC: 16 MG/DL — SIGNIFICANT CHANGE UP (ref 7–23)
CA-I SERPL-SCNC: 1.24 MMOL/L — SIGNIFICANT CHANGE UP (ref 1.15–1.33)
CALCIUM SERPL-MCNC: 9.6 MG/DL — SIGNIFICANT CHANGE UP (ref 8.4–10.5)
CHLORIDE BLDV-SCNC: 102 MMOL/L — SIGNIFICANT CHANGE UP (ref 96–108)
CHLORIDE SERPL-SCNC: 103 MMOL/L — SIGNIFICANT CHANGE UP (ref 96–108)
CO2 BLDV-SCNC: 33 MMOL/L — HIGH (ref 22–26)
CO2 SERPL-SCNC: 24 MMOL/L — SIGNIFICANT CHANGE UP (ref 22–31)
COLOR SPEC: SIGNIFICANT CHANGE UP
CREAT SERPL-MCNC: 0.84 MG/DL — SIGNIFICANT CHANGE UP (ref 0.5–1.3)
DIFF PNL FLD: NEGATIVE — SIGNIFICANT CHANGE UP
EGFR: 95 ML/MIN/1.73M2 — SIGNIFICANT CHANGE UP
EOSINOPHIL # BLD AUTO: 0.08 K/UL — SIGNIFICANT CHANGE UP (ref 0–0.5)
EOSINOPHIL NFR BLD AUTO: 1 % — SIGNIFICANT CHANGE UP (ref 0–6)
GAS PNL BLDV: 136 MMOL/L — SIGNIFICANT CHANGE UP (ref 136–145)
GAS PNL BLDV: SIGNIFICANT CHANGE UP
GLUCOSE BLDV-MCNC: 106 MG/DL — HIGH (ref 70–99)
GLUCOSE SERPL-MCNC: 103 MG/DL — HIGH (ref 70–99)
GLUCOSE UR QL: NEGATIVE — SIGNIFICANT CHANGE UP
HCG SERPL-ACNC: 705.9 MIU/ML — HIGH
HCO3 BLDV-SCNC: 32 MMOL/L — HIGH (ref 22–29)
HCT VFR BLD CALC: 37.9 % — SIGNIFICANT CHANGE UP (ref 34.5–45)
HCT VFR BLDA CALC: 37 % — SIGNIFICANT CHANGE UP (ref 34.5–46.5)
HGB BLD CALC-MCNC: 12.4 G/DL — SIGNIFICANT CHANGE UP (ref 11.7–16.1)
HGB BLD-MCNC: 12.1 G/DL — SIGNIFICANT CHANGE UP (ref 11.5–15.5)
IMM GRANULOCYTES NFR BLD AUTO: 0.4 % — SIGNIFICANT CHANGE UP (ref 0–0.9)
KETONES UR-MCNC: NEGATIVE — SIGNIFICANT CHANGE UP
LACTATE BLDV-MCNC: 1.7 MMOL/L — SIGNIFICANT CHANGE UP (ref 0.5–2)
LEUKOCYTE ESTERASE UR-ACNC: NEGATIVE — SIGNIFICANT CHANGE UP
LIDOCAIN IGE QN: 32 U/L — SIGNIFICANT CHANGE UP (ref 7–60)
LYMPHOCYTES # BLD AUTO: 2.38 K/UL — SIGNIFICANT CHANGE UP (ref 1–3.3)
LYMPHOCYTES # BLD AUTO: 31 % — SIGNIFICANT CHANGE UP (ref 13–44)
MCHC RBC-ENTMCNC: 28.3 PG — SIGNIFICANT CHANGE UP (ref 27–34)
MCHC RBC-ENTMCNC: 31.9 GM/DL — LOW (ref 32–36)
MCV RBC AUTO: 88.8 FL — SIGNIFICANT CHANGE UP (ref 80–100)
MONOCYTES # BLD AUTO: 0.74 K/UL — SIGNIFICANT CHANGE UP (ref 0–0.9)
MONOCYTES NFR BLD AUTO: 9.6 % — SIGNIFICANT CHANGE UP (ref 2–14)
NEUTROPHILS # BLD AUTO: 4.42 K/UL — SIGNIFICANT CHANGE UP (ref 1.8–7.4)
NEUTROPHILS NFR BLD AUTO: 57.7 % — SIGNIFICANT CHANGE UP (ref 43–77)
NITRITE UR-MCNC: NEGATIVE — SIGNIFICANT CHANGE UP
NRBC # BLD: 0 /100 WBCS — SIGNIFICANT CHANGE UP (ref 0–0)
PCO2 BLDV: 60 MMHG — HIGH (ref 39–42)
PH BLDV: 7.33 — SIGNIFICANT CHANGE UP (ref 7.32–7.43)
PH UR: 7 — SIGNIFICANT CHANGE UP (ref 5–8)
PLATELET # BLD AUTO: 292 K/UL — SIGNIFICANT CHANGE UP (ref 150–400)
PO2 BLDV: 24 MMHG — LOW (ref 25–45)
POTASSIUM BLDV-SCNC: 4.5 MMOL/L — SIGNIFICANT CHANGE UP (ref 3.5–5.1)
POTASSIUM SERPL-MCNC: 4.3 MMOL/L — SIGNIFICANT CHANGE UP (ref 3.5–5.3)
POTASSIUM SERPL-SCNC: 4.3 MMOL/L — SIGNIFICANT CHANGE UP (ref 3.5–5.3)
PROT SERPL-MCNC: 7 G/DL — SIGNIFICANT CHANGE UP (ref 6–8.3)
PROT UR-MCNC: NEGATIVE — SIGNIFICANT CHANGE UP
RBC # BLD: 4.27 M/UL — SIGNIFICANT CHANGE UP (ref 3.8–5.2)
RBC # FLD: 13.2 % — SIGNIFICANT CHANGE UP (ref 10.3–14.5)
RH IG SCN BLD-IMP: POSITIVE — SIGNIFICANT CHANGE UP
SAO2 % BLDV: 35.6 % — LOW (ref 67–88)
SODIUM SERPL-SCNC: 138 MMOL/L — SIGNIFICANT CHANGE UP (ref 135–145)
SP GR SPEC: 1.02 — SIGNIFICANT CHANGE UP (ref 1.01–1.02)
UROBILINOGEN FLD QL: NEGATIVE — SIGNIFICANT CHANGE UP
WBC # BLD: 7.67 K/UL — SIGNIFICANT CHANGE UP (ref 3.8–10.5)
WBC # FLD AUTO: 7.67 K/UL — SIGNIFICANT CHANGE UP (ref 3.8–10.5)

## 2023-01-17 PROCEDURE — 81003 URINALYSIS AUTO W/O SCOPE: CPT

## 2023-01-17 PROCEDURE — 85018 HEMOGLOBIN: CPT

## 2023-01-17 PROCEDURE — 82435 ASSAY OF BLOOD CHLORIDE: CPT

## 2023-01-17 PROCEDURE — 99284 EMERGENCY DEPT VISIT MOD MDM: CPT | Mod: 25

## 2023-01-17 PROCEDURE — 84702 CHORIONIC GONADOTROPIN TEST: CPT

## 2023-01-17 PROCEDURE — 83605 ASSAY OF LACTIC ACID: CPT

## 2023-01-17 PROCEDURE — 82803 BLOOD GASES ANY COMBINATION: CPT

## 2023-01-17 PROCEDURE — 85014 HEMATOCRIT: CPT

## 2023-01-17 PROCEDURE — 83690 ASSAY OF LIPASE: CPT

## 2023-01-17 PROCEDURE — 86900 BLOOD TYPING SEROLOGIC ABO: CPT

## 2023-01-17 PROCEDURE — 87086 URINE CULTURE/COLONY COUNT: CPT

## 2023-01-17 PROCEDURE — 84132 ASSAY OF SERUM POTASSIUM: CPT

## 2023-01-17 PROCEDURE — 93975 VASCULAR STUDY: CPT

## 2023-01-17 PROCEDURE — 82330 ASSAY OF CALCIUM: CPT

## 2023-01-17 PROCEDURE — 93975 VASCULAR STUDY: CPT | Mod: 26

## 2023-01-17 PROCEDURE — 82947 ASSAY GLUCOSE BLOOD QUANT: CPT

## 2023-01-17 PROCEDURE — 99285 EMERGENCY DEPT VISIT HI MDM: CPT

## 2023-01-17 PROCEDURE — 86901 BLOOD TYPING SEROLOGIC RH(D): CPT

## 2023-01-17 PROCEDURE — 84295 ASSAY OF SERUM SODIUM: CPT

## 2023-01-17 PROCEDURE — 85025 COMPLETE CBC W/AUTO DIFF WBC: CPT

## 2023-01-17 PROCEDURE — 80053 COMPREHEN METABOLIC PANEL: CPT

## 2023-01-17 PROCEDURE — 76830 TRANSVAGINAL US NON-OB: CPT

## 2023-01-17 PROCEDURE — 86850 RBC ANTIBODY SCREEN: CPT

## 2023-01-17 NOTE — ED PROVIDER NOTE - ATTENDING APP SHARED VISIT CONTRIBUTION OF CARE
RGUJRAL 32-year-old female  6 para 2 LMP 11/4 presents with suprapubic abdominal pain x2 weeks.  Patient tested for positive for pregnancy and sent in for further evaluation.  Patient reports 1 day of bleeding on , currently denies any bleeding.  No nausea or vomiting or diarrhea.  No fevers or chills.  Denies any discharge.   On exam, Patient is awake,alert,oriented x 3. Patient is well appearing and in no acute distress. Patient's chest is clear to ausculation, +s1s2. Abdomen is soft nd/+mild suprapubic ttp +BS. Pelvic Chaperone PA Marie, No CMT or adnexal ttp. Extremity with no swelling or calf tenderness.   Pt was qdoc, results reviewed. Pending TV US.

## 2023-01-17 NOTE — ED PROVIDER NOTE - RAPID ASSESSMENT
Dr. Tompkins ED Attending- p6p2 15 wks pregnancy + preg test today had left sided pelvic pain sent to ed for eval, follows at [facility] clinic, no prental care this pregnancy. had 3 mc at 2 months, 1 was ectopic pregnancy. denies f/c/n/c/d/c cp sob. denies vaginal bleeding or dc. 2 csections, NKDA    Patient was rapidly assessed via telemedicine encounter; a limited history and physical exam was performed. The patient will be seen and further worked up in the main emergency department and their care will be completed by the main emergency department team. Receiving team will follow up on labs, analgesia, any clinical imaging, and perform reassessment and disposition of the patient as clinically indicated.  All decisions regarding the progression of care will be made at their discretion. Dr. Tompkins ED Attending- p6p2 15 wks pregnancy + preg test today had left sided pelvic pain sent to ed for eval, follows at [facility] clinic, no prenatal care this pregnancy. had 3 mc at 2 months, 1 was ectopic pregnancy. denies f/c/n/c/d/c cp sob. denies vaginal bleeding or dc. 2 's, NKDA    Patient was rapidly assessed via telemedicine encounter; a limited history and physical exam was performed. The patient will be seen and further worked up in the main emergency department and their care will be completed by the main emergency department team. Receiving team will follow up on labs, analgesia, any clinical imaging, and perform reassessment and disposition of the patient as clinically indicated.  All decisions regarding the progression of care will be made at their discretion.

## 2023-01-17 NOTE — ED PROVIDER NOTE - PHYSICAL EXAMINATION
CONSTITUTIONAL: Patient is awake, alert and oriented x 3. Patient is well appearing and in no acute distress  HEAD: NCAT  NECK: supple, FROM  LUNGS: CTA b/l, no wheezing or rales   HEART: RRR.+S1S2 no murmurs  ABDOMEN: Soft, non-distended, mild suprapubic ttp, otherwise nttp, no rebound or guarding  EXTREMITY: no edema or calf tenderness b/l, FROM upper and lower ext b/l  SKIN: with no rash or lesions  NEURO: No focal deficits

## 2023-01-17 NOTE — ED PROVIDER NOTE - NS ED ATTENDING STATEMENT MOD
This was a shared visit with the SHIN. I reviewed and verified the documentation and independently performed the documented:

## 2023-01-17 NOTE — ED PROVIDER NOTE - PATIENT PORTAL LINK FT
You can access the FollowMyHealth Patient Portal offered by [facility] by registering at the following website: [email or web address]. By joining [employer]’s FollowMyHealth portal, you will also be able to view your health information using other applications (apps) compatible with our system.

## 2023-01-17 NOTE — ED PROVIDER NOTE - NSFOLLOWUPINSTRUCTIONS_ED_ALL_ED_FT
A pregnancy of unknown location (PUL) may be defined as the clinical scenario when a woman presents with a positive urine pregnancy test but the location of the pregnancy (either intrauterine or extrauterine) cannot be located using transvaginal ultrasonography (TVS) at the initial scan. It is important to note that is a classification and not a diagnosis. Further tests and follow-up are then required before the location and/or the viability of a pregnancy can be defined.    We are unable to rule-out an Ectopic Pregnancy at this time because we were unable to definitively identify the location of this pregnancy on US today.  This means that this pregnancy is either too early in the gestation to identify its location, meaning it could be in the uterus or abnormally located (tube, ovary, abdomen).  You will need to be re-evaluated in the ED in 48 hrs with repeat serum HCG testing and repeat transvaginal US.      SEEK IMMEDIATE MEDICAL CARE IF YOU HAVE ANY OF THE FOLLOWING SYMPTOMS: heavy vaginal bleeding, severe low back or abdominal cramps, fever/chills, lightheadedness/dizziness, or fainting.    RETURN TO THE ER IN 48 HRS FOR REPEAT HCG AND TRANSVAGINAL US.

## 2023-01-17 NOTE — ED PROVIDER NOTE - OBJECTIVE STATEMENT
32 year old female  with + Pregnancy test today presents to ED c/o pelvic pain. Patient reports she had a normal period starting  and then had 1 day of bleeding . Patient reports earlier today she had onset of suprapubic pain and took a pregnancy test which was positive. She reports that she became nervous because she has had 3 miscarriages in the past, 1 being an ectopic pregnancy  at 2 months, 1 was ectopic pregnancy. She reports pelvic pain worsened with urination. She denies fevers, chills, chest pain, sob, vaginal bleeding, discharge, dysuria

## 2023-01-18 VITALS
DIASTOLIC BLOOD PRESSURE: 57 MMHG | TEMPERATURE: 98 F | OXYGEN SATURATION: 98 % | RESPIRATION RATE: 16 BRPM | HEART RATE: 69 BPM | SYSTOLIC BLOOD PRESSURE: 95 MMHG

## 2023-01-18 LAB
CULTURE RESULTS: SIGNIFICANT CHANGE UP
SPECIMEN SOURCE: SIGNIFICANT CHANGE UP

## 2023-01-18 PROCEDURE — 76830 TRANSVAGINAL US NON-OB: CPT | Mod: 26

## 2023-01-18 NOTE — CONSULT NOTE ADULT - SUBJECTIVE AND OBJECTIVE BOX
GYN Consult Note    32y [id number] who presents to ED today with suprapubic pain.     Last Menstrual Period    Name of GYN Physician: C     OB/GYN HISTORY:   G1  C/S malpresentation (performed in [facility])   G2  repeat C/S @ [facility]   G3 SAB   G4 PUL with c/f ectopic on R. adnexa s/p MTX   Denies fibroids, ov cysts, STIs, abnl Pap smears    PMSH: fatty liver, C/S x2    Meds: none   All: NKDA        REVIEW OF SYSTEMS  Constitutional, Cardiovascular, Respiratory, Gastrointestinal, Genitourinary, Musculoskeletal and Integumentary review of systems are normal except as noted. 	        Vital Signs Last 24 Hrs  T(C): 36.7 (2023 16:35), Max: 36.7 (2023 16:35)  T(F): 98.1 (2023 16:35), Max: 98.1 (2023 16:35)  HR: 81 (2023 23:31) (81 - 85)  BP: 103/69 (2023 23:31) (103/69 - 125/84)  BP(mean): --  RR: 20 (2023 23:31) (20 - 20)  SpO2: 98% (2023 23:31) (96% - 98%)    Parameters below as of 2023 23:31  Patient On (Oxygen Delivery Method): room air        PHYSICAL EXAM:   Gen: NAD, alert and oriented x 3  Cardiovascular: regular   Respiratory: breathing comfortably on RA  Abd: soft, non tender, non-distended  Pelvic: closed/long, no CMT, Uterus: normal size, non tender  Adnexa: non tender, no palpable masses  Extremities: NTBL  Skin: warm and well perfused      LABS:                        12.1   7.67  )-----------( 292      ( 2023 19:05 )             37.9     01-17    138  |  103  |  16  ----------------------------<  103<H>  4.3   |  24  |  0.84    Ca    9.6      2023 19:05    TPro  7.0  /  Alb  4.1  /  TBili  0.2  /  DBili  x   /  AST  28  /  ALT  45  /  AlkPhos  65  01-17      Urinalysis Basic - ( 2023 19:05 )    Color: Light Yellow / Appearance: Clear / S.022 / pH: x  Gluc: x / Ketone: Negative  / Bili: Negative / Urobili: Negative   Blood: x / Protein: Negative / Nitrite: Negative   Leuk Esterase: Negative / RBC: x / WBC x   Sq Epi: x / Non Sq Epi: x / Bacteria: x        RADIOLOGY & ADDITIONAL STUDIES: GYN Consult Note    32y , LMP = 11/4, who presents to ED today with suprapubic pain x2 weeks. Describes the pain as crampy in nature and an 8/10 in intensity. States that the pain has been present daily for the last two weeks. Pain decreases to 2/10 in intensity with Tylenol. This pain feels similar to the pain she experienced during her prior miscarriages. Does report some pain suprapubic pain with urination but denies any other symptoms. Denies fevers/chills, CP/SOB, N/V. Has been experiencing increased fatigue in the last couple of weeks but no other pregnancy symptoms.     Patient reports her LMP is  but did have a single day of vaginal bleeding on . Her periods are regular, occurring monthly, and last 3-4 days.     Last Menstrual Period:  but with single day of vag bleeding on      Name of GYN Physician: [facility]     OB/GYN HISTORY:   G1  C/S malpresentation (performed in [facility])   G2  repeat C/S @ [facility]   G3 SAB   G4 PUL with c/f ectopic on R. adnexa s/p MTX     GynHx: Denies fibroids, ov cysts, STIs, abnl Pap smears    PMH: fatty liver  PSH: C/S x2 as above     Meds: none   All: NKDA        REVIEW OF SYSTEMS  Constitutional, Cardiovascular, Respiratory, Gastrointestinal, Genitourinary, Musculoskeletal and Integumentary review of systems are normal except as noted. 	        Vital Signs Last 24 Hrs  T(C): 36.7 (2023 16:35), Max: 36.7 (2023 16:35)  T(F): 98.1 (2023 16:35), Max: 98.1 (2023 16:35)  HR: 81 (2023 23:31) (81 - 85)  BP: 103/69 (2023 23:31) (103/69 - 125/84)  BP(mean): --  RR: 20 (2023 23:31) (20 - 20)  SpO2: 98% (2023 23:31) (96% - 98%)    Parameters below as of 2023 23:31  Patient On (Oxygen Delivery Method): room air        PHYSICAL EXAM:   Gen: NAD, alert and oriented x 3  Cardiovascular: regular   Respiratory: breathing comfortably on RA  Abd: soft, non-distended, minimal suprapubic tenderness   Pelvic: closed/long, no CMT, Uterus: normal size  Adnexa: non tender, no palpable masses  Extremities: NTBL  Skin: warm and well perfused      LABS:                        12.1   7.67  )-----------( 292      ( 2023 19:05 )             37.9         138  |  103  |  16  ----------------------------<  103<H>  4.3   |  24  |  0.84    Ca    9.6      2023 19:05    TPro  7.0  /  Alb  4.1  /  TBili  0.2  /  DBili  x   /  AST  28  /  ALT  45  /  AlkPhos  65        Urinalysis Basic - ( 2023 19:05 )    Color: Light Yellow / Appearance: Clear / S.022 / pH: x  Gluc: x / Ketone: Negative  / Bili: Negative / Urobili: Negative   Blood: x / Protein: Negative / Nitrite: Negative   Leuk Esterase: Negative / RBC: x / WBC x   Sq Epi: x / Non Sq Epi: x / Bacteria: x        RADIOLOGY & ADDITIONAL STUDIES:

## 2023-01-18 NOTE — CONSULT NOTE ADULT - ASSESSMENT
33 yo  with LMP 11/4 and single day of vag bleeding on  who presents today for evaluation of suprapubic pain x2 weeks, found to have b-HCG of 705.9 and no IUP on TVUS. Patient vitally stable with stable H/H  - rec repeat bHCG in 48 hours for trend; will contact clinic for apt   - no acute gyn intervention   - strict return precautions reviewed   - final recs pending TVUS reading by radiology     d/w Dr. Josephine Barrientos, PGY-2

## 2023-01-18 NOTE — ED ADULT NURSE NOTE - OBJECTIVE STATEMENT
32 year old female  with + Pregnancy test today presents to ED c/o pelvic pain. Patient states she had a normal period starting  and then had 1 day of bleeding . Patient reports earlier today she had onset of suprapubic pain and took a pregnancy test which was positive. She reports that she became nervous because she has had 3 miscarriages in the past, 1 being an ectopic pregnancy  at 2 months. She reports pelvic pain worsened with urination. She denies fevers, chills, chest pain, sob, vaginal bleeding, discharge, dysuria

## 2023-01-19 ENCOUNTER — APPOINTMENT (OUTPATIENT)
Dept: OBGYN | Facility: HOSPITAL | Age: 33
End: 2023-01-19
Payer: MEDICAID

## 2023-01-19 ENCOUNTER — OUTPATIENT (OUTPATIENT)
Dept: OUTPATIENT SERVICES | Facility: HOSPITAL | Age: 33
LOS: 1 days | End: 2023-01-19
Payer: MEDICAID

## 2023-01-19 ENCOUNTER — RESULT REVIEW (OUTPATIENT)
Age: 33
End: 2023-01-19

## 2023-01-19 ENCOUNTER — EMERGENCY (EMERGENCY)
Facility: HOSPITAL | Age: 33
LOS: 1 days | Discharge: HOME | End: 2023-01-19
Attending: EMERGENCY MEDICINE | Admitting: STUDENT IN AN ORGANIZED HEALTH CARE EDUCATION/TRAINING PROGRAM
Payer: MEDICAID

## 2023-01-19 ENCOUNTER — NON-APPOINTMENT (OUTPATIENT)
Age: 33
End: 2023-01-19

## 2023-01-19 VITALS
SYSTOLIC BLOOD PRESSURE: 111 MMHG | HEIGHT: 61 IN | BODY MASS INDEX: 28.32 KG/M2 | WEIGHT: 150 LBS | DIASTOLIC BLOOD PRESSURE: 68 MMHG | TEMPERATURE: 97.7 F | HEART RATE: 71 BPM

## 2023-01-19 VITALS
TEMPERATURE: 99 F | DIASTOLIC BLOOD PRESSURE: 61 MMHG | OXYGEN SATURATION: 99 % | HEIGHT: 59 IN | SYSTOLIC BLOOD PRESSURE: 101 MMHG | HEART RATE: 71 BPM | RESPIRATION RATE: 16 BRPM

## 2023-01-19 VITALS
DIASTOLIC BLOOD PRESSURE: 64 MMHG | TEMPERATURE: 97 F | HEIGHT: 59 IN | WEIGHT: 150.36 LBS | HEART RATE: 83 BPM | SYSTOLIC BLOOD PRESSURE: 106 MMHG | RESPIRATION RATE: 16 BRPM | OXYGEN SATURATION: 98 %

## 2023-01-19 LAB
ALBUMIN SERPL ELPH-MCNC: 3.8 G/DL — SIGNIFICANT CHANGE UP (ref 3.3–5)
ALBUMIN SERPL ELPH-MCNC: 4.2 G/DL — SIGNIFICANT CHANGE UP (ref 3.3–5)
ALP SERPL-CCNC: 55 U/L — SIGNIFICANT CHANGE UP (ref 40–120)
ALP SERPL-CCNC: 62 U/L — SIGNIFICANT CHANGE UP (ref 40–120)
ALT FLD-CCNC: 30 U/L — SIGNIFICANT CHANGE UP (ref 4–33)
ALT FLD-CCNC: 39 U/L — HIGH (ref 4–33)
ANION GAP SERPL CALC-SCNC: 8 MMOL/L — SIGNIFICANT CHANGE UP (ref 7–14)
ANION GAP SERPL CALC-SCNC: 9 MMOL/L — SIGNIFICANT CHANGE UP (ref 7–14)
APTT BLD: 27.2 SEC — SIGNIFICANT CHANGE UP (ref 27–36.3)
AST SERPL-CCNC: 25 U/L — SIGNIFICANT CHANGE UP (ref 4–32)
AST SERPL-CCNC: 35 U/L — HIGH (ref 4–32)
BASOPHILS # BLD AUTO: 0.02 K/UL — SIGNIFICANT CHANGE UP (ref 0–0.2)
BASOPHILS NFR BLD AUTO: 0.2 % — SIGNIFICANT CHANGE UP (ref 0–2)
BILIRUB SERPL-MCNC: 0.2 MG/DL — SIGNIFICANT CHANGE UP (ref 0.2–1.2)
BILIRUB SERPL-MCNC: 0.3 MG/DL — SIGNIFICANT CHANGE UP (ref 0.2–1.2)
BLD GP AB SCN SERPL QL: NEGATIVE — SIGNIFICANT CHANGE UP
BUN SERPL-MCNC: 13 MG/DL — SIGNIFICANT CHANGE UP (ref 7–23)
BUN SERPL-MCNC: 15 MG/DL — SIGNIFICANT CHANGE UP (ref 7–23)
CALCIUM SERPL-MCNC: 8.1 MG/DL — LOW (ref 8.4–10.5)
CALCIUM SERPL-MCNC: 9.2 MG/DL — SIGNIFICANT CHANGE UP (ref 8.4–10.5)
CHLORIDE SERPL-SCNC: 104 MMOL/L — SIGNIFICANT CHANGE UP (ref 98–107)
CHLORIDE SERPL-SCNC: 108 MMOL/L — HIGH (ref 98–107)
CO2 SERPL-SCNC: 23 MMOL/L — SIGNIFICANT CHANGE UP (ref 22–31)
CO2 SERPL-SCNC: 25 MMOL/L — SIGNIFICANT CHANGE UP (ref 22–31)
CREAT SERPL-MCNC: 0.69 MG/DL — SIGNIFICANT CHANGE UP (ref 0.5–1.3)
CREAT SERPL-MCNC: 0.73 MG/DL — SIGNIFICANT CHANGE UP (ref 0.5–1.3)
EGFR: 113 ML/MIN/1.73M2 — SIGNIFICANT CHANGE UP
EGFR: 119 ML/MIN/1.73M2 — SIGNIFICANT CHANGE UP
EOSINOPHIL # BLD AUTO: 0.06 K/UL — SIGNIFICANT CHANGE UP (ref 0–0.5)
EOSINOPHIL NFR BLD AUTO: 0.7 % — SIGNIFICANT CHANGE UP (ref 0–6)
FLUAV AG NPH QL: SIGNIFICANT CHANGE UP
FLUBV AG NPH QL: SIGNIFICANT CHANGE UP
GLUCOSE SERPL-MCNC: 97 MG/DL — SIGNIFICANT CHANGE UP (ref 70–99)
GLUCOSE SERPL-MCNC: 98 MG/DL — SIGNIFICANT CHANGE UP (ref 70–99)
HCG SERPL-ACNC: 810 MIU/ML — SIGNIFICANT CHANGE UP
HCT VFR BLD CALC: 39.4 % — SIGNIFICANT CHANGE UP (ref 34.5–45)
HGB BLD-MCNC: 12.3 G/DL — SIGNIFICANT CHANGE UP (ref 11.5–15.5)
IANC: 5.83 K/UL — SIGNIFICANT CHANGE UP (ref 1.8–7.4)
IMM GRANULOCYTES NFR BLD AUTO: 0.4 % — SIGNIFICANT CHANGE UP (ref 0–0.9)
INR BLD: 1.14 RATIO — SIGNIFICANT CHANGE UP (ref 0.88–1.16)
LYMPHOCYTES # BLD AUTO: 1.96 K/UL — SIGNIFICANT CHANGE UP (ref 1–3.3)
LYMPHOCYTES # BLD AUTO: 23 % — SIGNIFICANT CHANGE UP (ref 13–44)
MCHC RBC-ENTMCNC: 27.2 PG — SIGNIFICANT CHANGE UP (ref 27–34)
MCHC RBC-ENTMCNC: 31.2 GM/DL — LOW (ref 32–36)
MCV RBC AUTO: 87 FL — SIGNIFICANT CHANGE UP (ref 80–100)
MONOCYTES # BLD AUTO: 0.63 K/UL — SIGNIFICANT CHANGE UP (ref 0–0.9)
MONOCYTES NFR BLD AUTO: 7.4 % — SIGNIFICANT CHANGE UP (ref 2–14)
NEUTROPHILS # BLD AUTO: 5.83 K/UL — SIGNIFICANT CHANGE UP (ref 1.8–7.4)
NEUTROPHILS NFR BLD AUTO: 68.3 % — SIGNIFICANT CHANGE UP (ref 43–77)
NRBC # BLD: 0 /100 WBCS — SIGNIFICANT CHANGE UP (ref 0–0)
NRBC # FLD: 0 K/UL — SIGNIFICANT CHANGE UP (ref 0–0)
PLATELET # BLD AUTO: 306 K/UL — SIGNIFICANT CHANGE UP (ref 150–400)
POTASSIUM SERPL-MCNC: 4 MMOL/L — SIGNIFICANT CHANGE UP (ref 3.5–5.3)
POTASSIUM SERPL-MCNC: 4.5 MMOL/L — SIGNIFICANT CHANGE UP (ref 3.5–5.3)
POTASSIUM SERPL-SCNC: 4 MMOL/L — SIGNIFICANT CHANGE UP (ref 3.5–5.3)
POTASSIUM SERPL-SCNC: 4.5 MMOL/L — SIGNIFICANT CHANGE UP (ref 3.5–5.3)
PROT SERPL-MCNC: 6.2 G/DL — SIGNIFICANT CHANGE UP (ref 6–8.3)
PROT SERPL-MCNC: 7.2 G/DL — SIGNIFICANT CHANGE UP (ref 6–8.3)
PROTHROM AB SERPL-ACNC: 13.2 SEC — SIGNIFICANT CHANGE UP (ref 10.5–13.4)
RBC # BLD: 4.53 M/UL — SIGNIFICANT CHANGE UP (ref 3.8–5.2)
RBC # FLD: 13 % — SIGNIFICANT CHANGE UP (ref 10.3–14.5)
RH IG SCN BLD-IMP: POSITIVE — SIGNIFICANT CHANGE UP
RSV RNA NPH QL NAA+NON-PROBE: SIGNIFICANT CHANGE UP
SARS-COV-2 RNA SPEC QL NAA+PROBE: SIGNIFICANT CHANGE UP
SODIUM SERPL-SCNC: 138 MMOL/L — SIGNIFICANT CHANGE UP (ref 135–145)
SODIUM SERPL-SCNC: 139 MMOL/L — SIGNIFICANT CHANGE UP (ref 135–145)
WBC # BLD: 8.53 K/UL — SIGNIFICANT CHANGE UP (ref 3.8–10.5)
WBC # FLD AUTO: 8.53 K/UL — SIGNIFICANT CHANGE UP (ref 3.8–10.5)

## 2023-01-19 PROCEDURE — 99285 EMERGENCY DEPT VISIT HI MDM: CPT

## 2023-01-19 PROCEDURE — 99213 OFFICE O/P EST LOW 20 MIN: CPT | Mod: GC

## 2023-01-19 PROCEDURE — 76801 OB US < 14 WKS SINGLE FETUS: CPT | Mod: 26

## 2023-01-19 RX ORDER — ACETAMINOPHEN 500 MG
975 TABLET ORAL ONCE
Refills: 0 | Status: COMPLETED | OUTPATIENT
Start: 2023-01-19 | End: 2023-01-19

## 2023-01-19 RX ORDER — SODIUM CHLORIDE 9 MG/ML
1000 INJECTION INTRAMUSCULAR; INTRAVENOUS; SUBCUTANEOUS ONCE
Refills: 0 | Status: COMPLETED | OUTPATIENT
Start: 2023-01-19 | End: 2023-01-19

## 2023-01-19 RX ADMIN — SODIUM CHLORIDE 1000 MILLILITER(S): 9 INJECTION INTRAMUSCULAR; INTRAVENOUS; SUBCUTANEOUS at 20:42

## 2023-01-19 RX ADMIN — Medication 975 MILLIGRAM(S): at 19:58

## 2023-01-19 NOTE — CONSULT NOTE ADULT - SUBJECTIVE AND OBJECTIVE BOX
MALLIKA MORSE  32y  Female [id number]    : [id number]    HPI: 32 year old  LMP 22 (EGA by LMP 9w+3d) with a single day of bleeding on 22 presents to the ED fro GYN clinic due to concerns for left ectopic pregnancy. Of note patient had previously been seen in the [facility] ED by GYN team. At this point patient came in complaining suprapubic pain for two weeks duration that was cramping in nature. At this time, patient's bHCG was 705.9 and TVUS showed no intrauterine or extrauterine pregnancy. Patient was instructed to f/u in clinic in 48 hours. In clinic, patient received a bHCG which was  810 and TVUS showed concern for L ectopic pregnancy so patient was sent to the ED for further evaluation.    On GYN evaluation in the ED patient denied any abdominal pain; however, stated that when she got abdominal pain at home she took Motrin and Tylenol which ameliorated the pain. Denied vaginal bleeding. Denies lightheadedness, dizziness, chest pain, shortness of breath, fevers, chills.    Name of GYN Physician: FATMATA    POB: C/S x2, SAB x1, Ectopic Pregnancy s/p MTX  Pgyn: Denies fibroids, cysts, abnormal paps, STIs  PMHx: Fatty liver  Meds: Denies  Surgeries: C/S x2  All: NKDA      Vital Signs Last 24 Hrs  T(C): 36.3 (2023 20:42), Max: 37.4 (2023 17:03)  T(F): 97.4 (2023 20:42), Max: 99.4 (2023 17:03)  HR: 83 (2023 20:42) (71 - 83)  BP: 106/64 (2023 20:42) (101/58 - 106/64)  BP(mean): --  RR: 16 (2023 20:42) (16 - 16)  SpO2: 98% (2023 20:42) (97% - 99%)    Parameters below as of 2023 20:42  Patient On (Oxygen Delivery Method): room air        Physical Exam:   General: sitting comfortably in bed, NAD   CV: RR S1S2  Lungs: CTA b/l, good air flow b/l   Abd: Soft, non-tender, non-distended.    :  No bleeding on pad.  External labia wnl.  Bimanual exam with no cervical motion tenderness, uterus wnl, adnexa non palpable b/l.  Cervix closed  Speculum Exam: Deferred   Ext: non-tender b/l, no edema     LABS:                              12.3   8.53  )-----------( 306      ( 2023 18:48 )             39.4         138  |  104  |  15  ----------------------------<  98  4.5   |  25  |  0.73    Ca    9.2      2023 18:48    TPro  7.2  /  Alb  4.2  /  TBili  0.3  /  DBili  x   /  AST  35<H>  /  ALT  39<H>  /  AlkPhos  62      I&O's Detail    PT/INR - ( 2023 18:48 )   PT: 13.2 sec;   INR: 1.14 ratio         PTT - ( 2023 18:48 )  PTT:27.2 sec      RADIOLOGY & ADDITIONAL STUDIES:    ACC: [id number] EXAM:  US OB LES THAN 14 WKS 1ST GEST   ORDERED BY: CASH PATEL     PROCEDURE DATE:  2023          INTERPRETATION:  CLINICAL INFORMATION: Slowly rising beta-hCG, 810 today.   Suprapubic pain. Evaluate for ectopic pregnancy.    LMP: 2022    Estimated Gestational Age by LMP: 10 weeks, 6 days.    COMPARISON: 2023.    Endovaginal and transabdominal pelvic sonogram. Color and Spectral   Doppler was performed.    FINDINGS:  Uterus: 7.5 x 4.6 x 4.0 cm.  scar.  Endometrium: No intrauterine pregnancy. 7 mm. Normal in appearance.    Right ovary: 2.4 x 1.6 x 1.5 cm. Within normal limits. Normal arterial   and venous waveforms.  Left ovary: 2.7 x 2.3 x 1.8 cm. Within normal limits. Normal arterial and   venous waveforms.  In the left adnexa, separate and medially from the left ovary, a 1.5 cm   hypoechoic structure is identified with minimal peripheral vascularity   and is best visualized transabdominally.    Fluid: None.    IMPRESSION:  Findings are suggestive of a left tubal ectopic pregnancy.    Findings discussed with Dr. Pelletier on 2023 4:20 PM with read back.    --- End of Report ---            TABBY SORENSEN MD; Attending Radiologist  This document has been electronically signed. 2023  4:24PM

## 2023-01-19 NOTE — REASON FOR VISIT
[[employer] ] : provided by [employer]   [Time Spent: ____ minutes] : Total time spent using  services: [unfilled] minutes. The patient's primary language is not English thus required  services. [Interpreters_IDNumber] : [id number] [Interpreters_FullName] : [employer]

## 2023-01-19 NOTE — ED PROVIDER NOTE - PATIENT PORTAL LINK FT
You can access the FollowMyHealth Patient Portal offered by [facility] by registering at the following website: [email or web address]. By joining [employer]’s FollowMyHealth portal, you will also be able to view your health information using other applications (apps) compatible with our system.

## 2023-01-19 NOTE — ED PROVIDER NOTE - OBJECTIVE STATEMENT
32-year-old female presents to the ED with diagnosis of ectopic on ultrasound in GYN clinic today.  Patient has been having left-sided abdominal pain for few days worsening as per patient.  Patient has history of ectopic pregnancy and has received methotrexate in the past.  Patient has not received any methotrexate for this pregnancy.  Patient denies any nausea vomiting or fevers and chills.

## 2023-01-19 NOTE — ED PROVIDER NOTE - PROGRESS NOTE DETAILS
Larry Bergman DO: received patient from Dr Barakat. OB/GYN team recommending methotrexate patient to follow-up with GYN office or back in ED stable for DC with outpatient follow-up.

## 2023-01-19 NOTE — ED PROVIDER NOTE - CLINICAL SUMMARY MEDICAL DECISION MAKING FREE TEXT BOX
32-year-old female with ectopic pregnancy noted on ultrasound today.  Patient sent to ED for evaluation by OB.  Patient is a candidate for methotrexate or surgical intervention.  We will check labs including LFTs and preop.  Patient appears to have preference for methotrexate but will allow OB to does need to discuss risks and benefits of both options.

## 2023-01-19 NOTE — CONSULT NOTE ADULT - ASSESSMENT
32 year old  LMP 22 (EGA by LMP 9w+3d) with a single day of bleeding on 22 presents to the ED fro GYN clinic due to concerns for left ectopic pregnancy. In the ED patient denied any abdominal pain at the time of GYN evaluation. Vital signs in the ED stable. Physical exam unremarkable. bHCG trend as follows 705.9 ()->810 (). Lab work showed a mild transaminitis of 35/39. Patient counseled extensively on MTX and surgery. Patient offered both options; however, patient endorsed wanting MTX.    -repeat CMP to make sure transaminitis resolves, if it resolves given the fact patient was not in pain in the ED and bHCG in 810, patient a candidate for MTX  -GYN to continue to follow until repeat CMP comes back    discussed w Dr Alessandro Stout PGY2 32 year old  LMP 22 (EGA by LMP 9w+3d) with a single day of bleeding on 22 presents to the ED fro GYN clinic due to concerns for left ectopic pregnancy. In the ED patient denied any abdominal pain at the time of GYN evaluation. Vital signs in the ED stable. Physical exam unremarkable. bHCG trend as follows 705.9 ()->810 (). Lab work showed a mild transaminitis of 35/39. Patient counseled extensively on MTX and surgery. Patient offered both options; however, patient endorsed wanting MTX.    -repeat CMP to make sure transaminitis resolves, if it resolves given the fact patient was not in pain in the ED and bHCG in 810, patient a candidate for MTX  -GYN to continue to follow until repeat CMP comes back    discussed w Dr Alessandro Stout PGY2    31 y/o  LMP 22 referred from Ob/Gyn clinic for treatment of ectopic pregnancy. Tx options reviewed with patient and patient elected treatment with MTX.  Efraín Francois M.D. 32 year old  LMP 22 (EGA by LMP 9w+3d) with a single day of bleeding on 22 presents to the ED fro GYN clinic due to concerns for left ectopic pregnancy. In the ED patient denied any abdominal pain at the time of GYN evaluation. Vital signs in the ED stable. Physical exam unremarkable. bHCG trend as follows 705.9 ()->810 (). Lab work showed a mild transaminitis of 35/39. Patient counseled extensively on MTX and surgery. Patient offered both options; however, patient endorsed wanting MTX.    -repeat CMP to make sure transaminitis resolves, if it resolves given the fact patient was not in pain in the ED and bHCG in 810, patient a candidate for MTX  -GYN to continue to follow until repeat CMP comes back    12:00a update, repeat CMP returned as normal, labs results as follows  -AST/ALT: 25/30, Cr: 0.69    -Patient elected for methotrexate therapy for treatment of ectopic pregnancy as opposed to surgical management.    -Patient counseled on methotrexate therapy as treatment for ectopic pregnancy.  Common side effects of the medication as well as restrictions while on the medication were reviewed with patient and patient signed methotrexate information sheet that was placed in her chart.    -Reviewed with patient the 15-20% methotrexate failure rate and possibility of necessity for additional dose of methotrexate.   -Consents for methotrexate signed with patient  -Chemotherapy drug order form sent to pharmacy with methotrexate dose calculated based on BSA for patient.   -Patient given strict precautions to call her physician or return to ED if she experiences any severe abdominal pain, dizziness, lightheadedness, severe n/v, or any heavy vaginal bleeding >2 pads/hour for >2 hours.    -Patient instructed on need for follow up of b-hcg on day 4 and day 7 of methotrexate therapy.  Patient intends to follow up in the ED or GYN office on Monday, 2023 and  respectively  -Once patient receives methotrexate therapy she is cleared for discharge from OBGYN perspective.  Primary management per ED team.     discussed w Dr Alessandro Stout PGY2    33 y/o  LMP 22 referred from Ob/Gyn clinic for treatment of ectopic pregnancy. Tx options reviewed with patient and patient elected treatment with MTX.  Efraín Francois M.D.

## 2023-01-19 NOTE — ED ADULT NURSE NOTE - CHIEF COMPLAINT QUOTE
Pt brought in from OBGYN clinic for ectopic pregnancy. Pt c/o left lower quadrant pain x 1 week. Denies N/V. Denies vaginal bleeding. LMP 11/4

## 2023-01-19 NOTE — ED ADULT TRIAGE NOTE - CHIEF COMPLAINT QUOTE
Pt brought in from OBGYN clinic for ectopic pregnancy. Pt c/o right lower quadrant pain x 1 week. Denies N/V. Denies vaginal bleeding. LMP 11/4 Pt brought in from OBGYN clinic for ectopic pregnancy. Pt c/o left lower quadrant pain x 1 week. Denies N/V. Denies vaginal bleeding. LMP 11/4

## 2023-01-19 NOTE — ED PROVIDER NOTE - NSFOLLOWUPINSTRUCTIONS_ED_ALL_ED_FT
1) Please follow up in the GYN office or Emergency Dept on Monday, January 20, 2023 and Thursday, January 23, 2023 respectively. Please call today for an appointment.  2) If you have any worsening of symptoms or any other concerns please return to the ED immediately.  3) You may have been given a copy of your labs and/or imaging.  Please go over these with your primary care doctor.       Ectopic Pregnancy       An ectopic pregnancy happens when a fertilized egg attaches (implants) outside the uterus. In a normal pregnancy, a fertilized egg implants in the uterus. An ectopic pregnancy cannot develop into a healthy baby. Most ectopic pregnancies occur in one of the fallopian tubes, which is where an egg travels from an ovary to get to the uterus. This is called a tubal pregnancy. An ectopic pregnancy can also happen on an ovary, on the cervix, or in the abdomen.    When a fertilized egg implants on tissue outside the uterus and begins to grow, it may cause the tissue to tear or burst. This is known as a ruptured ectopic pregnancy. The tear or burst causes internal bleeding. This may cause intense pain in the abdomen. An ectopic pregnancy is a medical emergency and can be life-threatening.      What are the causes?    The most common cause of this condition is damage to one of the fallopian tubes. A fallopian tube may be narrowed or blocked, and that stops the fertilized egg from reaching the uterus.    Sometimes, the cause of this condition is not known.      What increases the risk?    The following factors may make you more likely to develop this condition:  •Having gone through infertility treatment before.      •Having had an ectopic pregnancy before.      •Having had surgery to have the fallopian tubes tied.      •Becoming pregnant while using an intrauterine device for birth control.      •Taking birth control pills before the age of 16.      Other risk factors include:  •Smoking.      •Alcohol use.      •History of SHONDA exposure. SHONDA is a medicine that was used until 1971 and affected babies whose mothers took the medicine.        What are the signs or symptoms?    Common symptoms of this condition include:  •Missing a menstrual period.      •Nausea or tiredness.      •Tender breasts.      •Other normal pregnancy symptoms.      Other symptoms may include:  •Pain during sex.      •Vaginal bleeding or spotting.      •Cramping or pain in the lower abdomen.      •A fast heartbeat, low blood pressure, and sweating.      •Pain or increased pressure while having a bowel movement.      Symptoms of a ruptured ectopic pregnancy and internal bleeding may include:  •Sudden, severe pain in the abdomen.      •Dizziness, weakness, feeling light-headed, or fainting.      •Pain in the shoulder or neck area.        How is this diagnosed?    This condition is diagnosed by:  •A blood test to check for the pregnancy hormone.      •A pelvic exam to find painful areas or a mass in the abdomen.      •Ultrasound. A probe is inserted into the vagina to see if there is a pregnancy in or outside the uterus.      •Taking a sample of tissue from the uterus.      •Surgery to look closely at the fallopian tubes through an incision in the abdomen.        How is this treated?    This condition is usually treated with medicine or surgery. Sometimes, ectopic pregnancies can resolve on their own, under close monitoring by your health care provider.    Medicine     A medicine called methotrexate may be given to cause the pregnancy tissue to be absorbed. The medicine may be given if:  •The diagnosis is made early, with no signs of active bleeding.      •The fallopian tube has not torn or burst.      You will need blood tests to make sure the medicine is working. It may take 4–6 weeks for the pregnancy tissues to be absorbed.    Surgery    Surgery may be performed to:  •Remove the pregnancy tissue.      •Stop internal bleeding.      •Remove part or all of the fallopian tube.      •Remove the uterus. This is rare.      After surgery, you may need to have blood tests to make sure the surgery worked.      Follow these instructions at home:    Medicines     •Take over-the-counter and prescription medicines only as told by your health care provider.    •Ask your health care provider if the medicine prescribed to you:  •Requires you to avoid driving or using machinery.    •Can cause constipation. You may need to take these actions to prevent or treat constipation:  •Drink enough fluid to keep your urine pale yellow.      •Take over-the-counter or prescription medicines.      •Eat foods that are high in fiber, such as beans, whole grains, and fresh fruits and vegetables.      •Limit foods that are high in fat and processed sugars, such as fried or sweet foods.          General instructions     •Rest or limit your activity, if told by your health care provider.      • Do not have sex or put anything in your vagina, such as tampons or douches, for 6 weeks or until your health care provider says it is safe.      • Do not lift anything that is heavier than 10 lb (4.5 kg), or the limit that you are told, until your health care provider says that it is safe.      •Return to your normal activities as told by your health care provider. Ask your health care provider what activities are safe for you.      •Keep all follow-up visits. This is important.        Contact a health care provider if:    •You have a fever or chills.      •You have nausea and vomiting.        Get help right away if:    •Your pain gets worse or is not relieved by medicine.      •You feel dizzy or weak.      •You feel light-headed or you faint.      •You have sudden, severe pain in your abdomen.      •You have sudden pain in the shoulder or neck area.        Summary    •An ectopic pregnancy happens when a fertilized egg implants outside the uterus. Most ectopic pregnancies occur in one of the fallopian tubes.      •An ectopic pregnancy is a medical emergency and can be life-threatening.      •The most common cause of this condition is damage to one of the fallopian tubes.      •This condition is usually treated with medicine or surgery. Some ectopic pregnancies resolve on their own, under close monitoring by your health care provider.      This information is not intended to replace advice given to you by your health care provider. Make sure you discuss any questions you have with your health care provider.

## 2023-01-19 NOTE — ED ADULT NURSE NOTE - OBJECTIVE STATEMENT
patient presents to ed c/o "ectopic pregnancy" reports going to the clinic today for l sided abdominal pain and reports being told she has an ectopic pregnancy. patient denies any bleeding, cp, sob, n/v, fever/chills.

## 2023-01-20 DIAGNOSIS — O00.90 UNSPECIFIED ECTOPIC PREGNANCY WITHOUT INTRAUTERINE PREGNANCY: ICD-10-CM

## 2023-01-20 DIAGNOSIS — Z00.00 ENCOUNTER FOR GENERAL ADULT MEDICAL EXAMINATION WITHOUT ABNORMAL FINDINGS: ICD-10-CM

## 2023-01-20 RX ORDER — METHOTREXATE 2.5 MG/1
75 TABLET ORAL ONCE
Refills: 0 | Status: COMPLETED | OUTPATIENT
Start: 2023-01-20 | End: 2023-01-20

## 2023-01-20 RX ADMIN — METHOTREXATE 75 MILLIGRAM(S): 2.5 TABLET ORAL at 02:22

## 2023-01-20 NOTE — ED ADULT NURSE REASSESSMENT NOTE - NS ED NURSE REASSESS COMMENT FT1
Methotrexate administered, pt verbalizes understanding of reason for medication and possible side effects. pt in NAD

## 2023-01-23 ENCOUNTER — RESULT REVIEW (OUTPATIENT)
Age: 33
End: 2023-01-23

## 2023-01-23 ENCOUNTER — APPOINTMENT (OUTPATIENT)
Dept: OBGYN | Facility: HOSPITAL | Age: 33
End: 2023-01-23
Payer: MEDICAID

## 2023-01-23 ENCOUNTER — OUTPATIENT (OUTPATIENT)
Dept: OUTPATIENT SERVICES | Facility: HOSPITAL | Age: 33
LOS: 1 days | End: 2023-01-23

## 2023-01-23 DIAGNOSIS — K29.70 GASTRITIS, UNSPECIFIED, W/OUT BLEEDING: ICD-10-CM

## 2023-01-23 LAB — HCG SERPL-ACNC: 671.1 MIU/ML — SIGNIFICANT CHANGE UP

## 2023-01-23 PROCEDURE — ZZZZZ: CPT

## 2023-01-23 RX ORDER — OMEPRAZOLE 20 MG/1
20 CAPSULE, DELAYED RELEASE ORAL TWICE DAILY
Qty: 28 | Refills: 0 | Status: ACTIVE | COMMUNITY
Start: 2023-01-23 | End: 1900-01-01

## 2023-01-24 DIAGNOSIS — Z32.00 ENCOUNTER FOR PREGNANCY TEST, RESULT UNKNOWN: ICD-10-CM

## 2023-01-25 NOTE — HISTORY OF PRESENT ILLNESS
[FreeTextEntry1] : Interpeter #[id number]\par \par Patient presented for day 4 bHCG. MTX griven in the hospital. Patient currently doing well with no acute complaints. Patient to followup on thursday for day 7hCG. \par \par seema Farrell\par Lito Pelletier PGY4. \par \par

## 2023-01-26 ENCOUNTER — OUTPATIENT (OUTPATIENT)
Dept: OUTPATIENT SERVICES | Facility: HOSPITAL | Age: 33
LOS: 1 days | End: 2023-01-26

## 2023-01-26 ENCOUNTER — APPOINTMENT (OUTPATIENT)
Dept: OBGYN | Facility: HOSPITAL | Age: 33
End: 2023-01-26
Payer: MEDICAID

## 2023-01-26 ENCOUNTER — RESULT REVIEW (OUTPATIENT)
Age: 33
End: 2023-01-26

## 2023-01-26 VITALS
SYSTOLIC BLOOD PRESSURE: 110 MMHG | WEIGHT: 149 LBS | DIASTOLIC BLOOD PRESSURE: 70 MMHG | BODY MASS INDEX: 28.13 KG/M2 | TEMPERATURE: 97.6 F | HEART RATE: 69 BPM | HEIGHT: 61 IN

## 2023-01-26 DIAGNOSIS — Z00.00 ENCOUNTER FOR GENERAL ADULT MEDICAL EXAMINATION W/OUT ABNORMAL FINDINGS: ICD-10-CM

## 2023-01-26 DIAGNOSIS — O36.80X0 PREGNANCY WITH INCONCLUSIVE FETAL VIABILITY, NOT APPLICABLE OR UNSPECIFIED: ICD-10-CM

## 2023-01-26 DIAGNOSIS — B37.31 ACUTE CANDIDIASIS OF VULVA AND VAGINA: ICD-10-CM

## 2023-01-26 LAB — HCG SERPL-ACNC: 444.4 MIU/ML — SIGNIFICANT CHANGE UP

## 2023-01-26 PROCEDURE — 99213 OFFICE O/P EST LOW 20 MIN: CPT | Mod: GC

## 2023-01-27 ENCOUNTER — NON-APPOINTMENT (OUTPATIENT)
Age: 33
End: 2023-01-27

## 2023-01-27 DIAGNOSIS — O09.90 SUPERVISION OF HIGH RISK PREGNANCY, UNSPECIFIED, UNSPECIFIED TRIMESTER: ICD-10-CM

## 2023-01-27 DIAGNOSIS — B37.31 ACUTE CANDIDIASIS OF VULVA AND VAGINA: ICD-10-CM

## 2023-01-27 DIAGNOSIS — Z00.00 ENCOUNTER FOR GENERAL ADULT MEDICAL EXAMINATION WITHOUT ABNORMAL FINDINGS: ICD-10-CM

## 2023-01-27 DIAGNOSIS — O36.80X0 PREGNANCY WITH INCONCLUSIVE FETAL VIABILITY, NOT APPLICABLE OR UNSPECIFIED: ICD-10-CM

## 2023-01-28 ENCOUNTER — EMERGENCY (EMERGENCY)
Facility: HOSPITAL | Age: 33
LOS: 1 days | Discharge: HOME | End: 2023-01-28
Attending: STUDENT IN AN ORGANIZED HEALTH CARE EDUCATION/TRAINING PROGRAM
Payer: MEDICAID

## 2023-01-28 VITALS
DIASTOLIC BLOOD PRESSURE: 64 MMHG | WEIGHT: 149.03 LBS | OXYGEN SATURATION: 98 % | HEART RATE: 73 BPM | HEIGHT: 59 IN | SYSTOLIC BLOOD PRESSURE: 99 MMHG | RESPIRATION RATE: 17 BRPM | TEMPERATURE: 98 F

## 2023-01-28 VITALS
SYSTOLIC BLOOD PRESSURE: 105 MMHG | RESPIRATION RATE: 18 BRPM | HEART RATE: 78 BPM | DIASTOLIC BLOOD PRESSURE: 80 MMHG | TEMPERATURE: 98 F | OXYGEN SATURATION: 99 %

## 2023-01-28 LAB — HCG SERPL-ACNC: 236.3 MIU/ML — HIGH

## 2023-01-28 PROCEDURE — 99284 EMERGENCY DEPT VISIT MOD MDM: CPT

## 2023-01-28 PROCEDURE — 36415 COLL VENOUS BLD VENIPUNCTURE: CPT

## 2023-01-28 PROCEDURE — 99283 EMERGENCY DEPT VISIT LOW MDM: CPT

## 2023-01-28 PROCEDURE — 84702 CHORIONIC GONADOTROPIN TEST: CPT

## 2023-01-28 NOTE — ED PROVIDER NOTE - OBJECTIVE STATEMENT
31 yo F  w/ LMP 22, received MTX on 23 for L ectopic pregnancy presenting for repeat beta HCG testing. States followed up last 2 days ago with obgyn clinic and was told to come to ED for repeat testing but that her levels had been decreasing appropriately per her obgyn. States she had LLQ pain 1 week ago but nothing since getting MTX. Denies VB/VD. ROS as below.

## 2023-01-28 NOTE — ED PROVIDER NOTE - NSFOLLOWUPINSTRUCTIONS_ED_ALL_ED_FT
You were seen in the Emergency Department for a repeat beta HCG ("pregnancy hormone") level. It has decreased appropriately. FOllow up with your obgyn at [facility] in 1 week.     1) Advance activity as tolerated.   2) Continue all previously prescribed medications as directed.    3) Follow up with your primary care physician in 24-48 hours - take copies of your results.    4) Return to the Emergency Department for worsening or persistent symptoms, and/or ANY NEW OR CONCERNING SYMPTOMS.

## 2023-01-28 NOTE — CONSULT NOTE ADULT - ATTENDING COMMENTS
ATTG note    Read and agree with above PGY2 note    31 yo P2 LMP 1/14 with known ectopic s/p MTX on 1/19 with appropriate response now here for f/u.  Pt without complaints today.  NO significant pain today nor bleeding.    VSS  HCG today - 236 from 444    31 yo P2 s/p MTX for left ectopic pregnancy with known appropriate response for f/u today and again noted to have dropping HCG.   No further intervention needed, ectopic continues to respond to mgt.  -pt clinically stable to dc  -can f/u in [facility] clinic in 1 wk for rpt HCG   -return to ED if increase in pain and/or bleeding    VAUGHN White

## 2023-01-28 NOTE — ED PROVIDER NOTE - PROGRESS NOTE DETAILS
Beta HCG downtrending. Seen by OB resident who states pt stable for dc and to followup in 1 week at [facility] OBGYN clinic. Patient instructed on this and anticipatory guidance given. Stable for dc with return precautions given.

## 2023-01-28 NOTE — ED ADULT NURSE NOTE - CINV DISCH TEACH PARTICIP
Call to Dr. Ball's office per patient request. Spoke with Elizabeth in office about patient request for numbing cream and something for pain. Asked if MD could call into pharmacy.   
Informed Dr Ball of pt teeth extraction 1 week ago/pt currently on amoxicillin/Last dose of Aleve 9/16 0800am  
Patient

## 2023-01-28 NOTE — ED ADULT NURSE NOTE - OBJECTIVE STATEMENT
PT is a 32 year old A&OX4 female with no significant PMH who presents to the ED from home for an HCG check. PT was seen in this ED and dx with an ectopic pregnancy on 01/19/2023. PT was given methotrexate and discharged. PT presents today for HCG blood work check. PT denies bleeding, N/V/D, hematuria, dysuria, fevers, chills, SOB, and chest pain. PT endorsing mild abdominal discomfort and has been managing her pain with Tylenol. PT is resting comfortably in bed, breathing unlabored on room air, and speaking in complete sentences. Skin is warm and dry, no diaphoresis noted. No edema noted to B/L extremities. Strong strength in B/L extremities, sensation intact, PT ambulatory with steady gait. Safety and comfort maintained.

## 2023-01-28 NOTE — ED ADULT TRIAGE NOTE - GLASGOW COMA SCALE: BEST VERBAL RESPONSE, MLM
Spoke to patient. Patient will be bringing by her childhood vaccination records along with a letter from her school to sign.  
(V5) oriented

## 2023-01-28 NOTE — CONSULT NOTE ADULT - SUBJECTIVE AND OBJECTIVE BOX
MALLIKA EPSTEIN  32y  Female [id number]    HPI:  33 y/o  LMP 22 presenting to ED for bHCG.    Patient previously seen in [facility] with cramping, found to be pregnant. Patient followed up in [facility] clinic with increased abdominal pain and concern for L ectopic pregnancy. Was given methotrexate on  with appropriate drop in HCG on Days 4/7. Followed up in clinic at [facility] two days later on  with continuing decreasing HCG with instructions to obtain repeat bHCG today. Patient feels well. Mild cramping, but no overt abdominal pain. Eating and drinking well and able to perform all daily activities.    bHC.9 ()->810 ()->MTX->671()->444.4 ()    Name of GYN Physician: service    POB: C/S x2, SAB x1, Ectopic Pregnancy s/p MTX  Pgyn: Denies fibroids, cysts, abnormal paps, STIs  PMHx: Fatty liver  Meds: Denies  Surgeries: C/S x2  All: NKDA    Vital Signs Last 24 Hrs  T(C): 37 (2023 21:50), Max: 37 (2023 21:50)  T(F): 98.6 (2023 21:50), Max: 98.6 (2023 21:50)  HR: 80 (2023 21:50) (73 - 80)  BP: 109/78 (2023 21:50) (99/64 - 109/78)  BP(mean): 88 (2023 21:50) (88 - 88)  RR: 18 (2023 21:50) (17 - 18)  SpO2: 99% (2023 21:50) (98% - 99%)    Parameters below as of 2023 21:50  Patient On (Oxygen Delivery Method): room air    Physical Exam:   General: sitting comfortably in bed, NAD   Abd: Soft, non-tender, non-distended.  Bowel sounds present.      LABS:  HCG Quantitative, Serum (23 @ 22:09)    HCG Quantitative, Serum: 236.3: HCG-Quantitative test interpretations: This test is intended only for the  detection & monitoring of pregnancy. For oncology studies order the tumor  marker test “HCG-TM” (hCG-Tumor Marker).  For pregnancy evaluation the reference values are as follows:  Negative:  <=4 mIU/mL  Indeterminate:  5 - 25 mIU/mL (suggest repeat testing in 72 hours)  Positive:  > 25 mIU/mL  Reference Values during Pregnancy:  Weeks after LMP* REFERENCE INTERVAL mIU/mL     3      6 - 71     4      10 - 750     5      220 - 7,100     6      160 - 32,000     7      3,700 - 164,000     8      32,000 - 150,000     9      64,000 - 151,000     10      47,000 - 187,000     12      28,000 - 211,000     14      14,000 - 63,000     15      12,000 - 71,000     16 - 18  8,000 - 58,000  * LMP:  Last Menstrual Period  HCG results of false positive and false negative for pregnancy are rare,  but can occur with this, and other, hCG tests. Claribel- and post-menopausal  females may have mildly elevated hCG concentrations usually less than 14  mIU/mL that are constant over time. mIU/mL

## 2023-01-28 NOTE — ED PROVIDER NOTE - PATIENT PORTAL LINK FT
You can access the FollowMyHealth Patient Portal offered by [facility] by registering at the following website: [email or web address]. By joining [employer]’s FollowMyHealth portal, you will also be able to view your health information using other applications (apps) compatible with our system.

## 2023-01-28 NOTE — ED PROVIDER NOTE - CLINICAL SUMMARY MEDICAL DECISION MAKING FREE TEXT BOX
33 yo F  w/ LMP 22, received MTX on 23 for L ectopic pregnancy presenting for repeat beta HCG testing. States followed up last 2 days ago with obgyn clinic and was told to come to ED for repeat testing but that her levels had been decreasing appropriately per her obgyn. States she had LLQ pain 1 week ago but nothing since getting MTX. Denies VB/VD.   Will get beta HCG, consult obgyn and reassess

## 2023-01-28 NOTE — ED ADULT TRIAGE NOTE - PRO INTERPRETER NEED 2
Number Of Freeze-Thaw Cycles: 1 freeze-thaw cycle
Render Post-Care Instructions In Note?: no
Duration Of Freeze Thaw-Cycle (Seconds): 30
Aperture Size (Optional): C
Post-Care Instructions: I reviewed with the patient in detail post-care instructions. Patient is to wear sunprotection, and avoid picking at any of the treated lesions. Pt may apply Vaseline to crusted or scabbing areas.
Consent: The patient's consent was obtained including but not limited to risks of crusting, scabbing, blistering, scarring, darker or lighter pigmentary change, recurrence, incomplete removal and infection.
Detail Level: Generalized
no
[place]

## 2023-01-28 NOTE — CONSULT NOTE ADULT - ASSESSMENT
33 y/o  LMP 22 presenting to ED for bHCG follow-up following methotrexate on  with appropriate drop. Patient in stable condition.    #Ectopic Pregnancy s/p Methotrexate  - bHC.9 ()->810 ()->MTX->671()->444.4 ()->236.3() 33 y/o  LMP 22 presenting to ED for bHCG follow-up following methotrexate on  with appropriate drop. Patient in stable condition.    #Ectopic Pregnancy s/p Methotrexate  - bHC.9 ()->810 ()->MTX->671()->444.4 ()->236.3()  - bHCG with appropriate drop following MTX, patient feeling well and currently asymptomatic  - d/c with ectopic precautions  - to f/u in [facility] clinic in 1 wk for repeat beta HCG, to follow to 0    d/w attending Dr Christopher Ricketts  PGY-2

## 2023-01-28 NOTE — ED PROVIDER NOTE - PHYSICAL EXAMINATION
GENERAL: Vital signs are within normal limits  EYES: Conjunctiva noninjected or pale, sclera anicteric  HENT: NC/AT, moist mucous membranes  NECK: Supple, trachea midline  LUNG: Nonlabored respirations, no wheezes, rales  CV: RRR, Pulses- Radial/dorsalis pedis: 2+ bilateral and equal  ABDOMEN: Nondistended, nontender  MSK: No visible deformities, nontender extremities  SKIN: No rashes, bruises  NEURO: AAOx4 (to person, place, time, event), no tremor, steady gait  PSYCH: Normal mood and affect

## 2023-01-28 NOTE — ED ADULT NURSE NOTE - CAS EDP DISCH TYPE
Please contact pt to see if [facility] has contacted her to follow up on her Mammogram and ultrasound of left breast from March. They recommended 6 month f/u.   If not, I will need to order Home

## 2023-02-01 ENCOUNTER — OUTPATIENT (OUTPATIENT)
Dept: OUTPATIENT SERVICES | Facility: HOSPITAL | Age: 33
LOS: 1 days | End: 2023-02-01

## 2023-02-01 ENCOUNTER — RESULT REVIEW (OUTPATIENT)
Age: 33
End: 2023-02-01

## 2023-02-01 ENCOUNTER — APPOINTMENT (OUTPATIENT)
Dept: OBGYN | Facility: HOSPITAL | Age: 33
End: 2023-02-01
Payer: MEDICAID

## 2023-02-01 VITALS
TEMPERATURE: 97.7 F | WEIGHT: 149 LBS | SYSTOLIC BLOOD PRESSURE: 103 MMHG | BODY MASS INDEX: 28.13 KG/M2 | HEART RATE: 69 BPM | DIASTOLIC BLOOD PRESSURE: 58 MMHG | HEIGHT: 61 IN

## 2023-02-01 LAB — HCG SERPL-ACNC: 113.5 MIU/ML — SIGNIFICANT CHANGE UP

## 2023-02-01 PROCEDURE — 99213 OFFICE O/P EST LOW 20 MIN: CPT | Mod: GE

## 2023-02-02 DIAGNOSIS — O00.90 UNSPECIFIED ECTOPIC PREGNANCY WITHOUT INTRAUTERINE PREGNANCY: ICD-10-CM

## 2023-02-02 NOTE — PHYSICAL EXAM
[Appropriately responsive] : appropriately responsive [Alert] : alert [No Acute Distress] : no acute distress [Soft] : soft [Non-distended] : non-distended [No HSM] : No HSM [No Lesions] : no lesions [No Mass] : no mass [Oriented x3] : oriented x3 [FreeTextEntry7] : tenderness to deep palpation of periumbilical and suprapubic regions, no point tenderness, no rebound, no guarding

## 2023-02-02 NOTE — PLAN
[FreeTextEntry1] : 33yo  who is s/p MTX on  for L ectopic pregnancy seen on TVUS on the same date. Her Day 4 and Day 7 beta-hCG with appropriate decrease. Her beta today is 113.5, and she is not complaining of continued heavy vaginal bleeding, and denies symptoms of anemia. \par \par \par - Repeat beta hCG testing in 1 week\par - Counseled on importance of contraception given history of MTX on \par - Patient declined options for LARC at this time; is considering Depo-Provera shot at next visit\par - Strict return precautions given to patient, including fever, chills, heavy vaginal bleeding, severe abd pain, N/V, or lightheadedness/dizziness\par \par \par D/w Dr. Garcia\par Kellie Diallo, PGY1

## 2023-02-02 NOTE — REVIEW OF SYSTEMS
[Abdominal Pain] : abdominal pain [Negative] : Psychiatric [Vomiting] : no vomiting [Nausea] : no nausea

## 2023-02-02 NOTE — HISTORY OF PRESENT ILLNESS
[FreeTextEntry1] : 33yo  LMP  who is presenting for beta-hCG follow-up. Patient was seen in the ED at [facility] on  with abdominal pain and +preg test at home. At that time, she was diagnosed with PUL and told to follow-up in 48 hours for repeat beta and U/S testing. On , TVUS was concerning for L tubal ectopic pregnancy. She was treated with MTX on . Beta trending as follows:\par 705.9 ()->810 ()->MTX-> Day 4:671()->Day 7:444.4 ()-> 236 ().\par \par Patient reports she stopped having vaginal bleeding last Monday (), and she denies any lightheadedness or dizziness. Denies any chest pains, palpitations, or SOB.

## 2023-02-07 ENCOUNTER — APPOINTMENT (OUTPATIENT)
Dept: OBGYN | Facility: HOSPITAL | Age: 33
End: 2023-02-07
Payer: MEDICAID

## 2023-02-07 ENCOUNTER — RESULT REVIEW (OUTPATIENT)
Age: 33
End: 2023-02-07

## 2023-02-07 ENCOUNTER — OUTPATIENT (OUTPATIENT)
Dept: OUTPATIENT SERVICES | Facility: HOSPITAL | Age: 33
LOS: 1 days | End: 2023-02-07

## 2023-02-07 VITALS
TEMPERATURE: 98.1 F | WEIGHT: 150 LBS | HEIGHT: 58 IN | BODY MASS INDEX: 31.49 KG/M2 | SYSTOLIC BLOOD PRESSURE: 107 MMHG | DIASTOLIC BLOOD PRESSURE: 52 MMHG | HEART RATE: 75 BPM

## 2023-02-07 LAB — HCG SERPL-ACNC: 25.7 MIU/ML — SIGNIFICANT CHANGE UP

## 2023-02-07 PROCEDURE — 99213 OFFICE O/P EST LOW 20 MIN: CPT | Mod: GE

## 2023-02-08 DIAGNOSIS — O00.109 UNSPECIFIED TUBAL PREGNANCY WITHOUT INTRAUTERINE PREGNANCY: ICD-10-CM

## 2023-02-14 ENCOUNTER — OUTPATIENT (OUTPATIENT)
Dept: OUTPATIENT SERVICES | Facility: HOSPITAL | Age: 33
LOS: 1 days | End: 2023-02-14

## 2023-02-14 ENCOUNTER — APPOINTMENT (OUTPATIENT)
Dept: OBGYN | Facility: HOSPITAL | Age: 33
End: 2023-02-14
Payer: MEDICAID

## 2023-02-14 ENCOUNTER — RESULT REVIEW (OUTPATIENT)
Age: 33
End: 2023-02-14

## 2023-02-14 VITALS
DIASTOLIC BLOOD PRESSURE: 64 MMHG | TEMPERATURE: 98.1 F | WEIGHT: 148 LBS | HEIGHT: 58 IN | HEART RATE: 69 BPM | BODY MASS INDEX: 31.07 KG/M2 | SYSTOLIC BLOOD PRESSURE: 102 MMHG

## 2023-02-14 LAB — HCG SERPL-ACNC: <5 MIU/ML — SIGNIFICANT CHANGE UP

## 2023-02-14 PROCEDURE — 99213 OFFICE O/P EST LOW 20 MIN: CPT | Mod: GE,25

## 2023-02-14 PROCEDURE — 96372 THER/PROPH/DIAG INJ SC/IM: CPT | Mod: NC

## 2023-02-14 RX ORDER — MEDROXYPROGESTERONE ACETATE 150 MG/ML
150 INJECTION, SUSPENSION INTRAMUSCULAR
Qty: 0 | Refills: 0 | Status: COMPLETED | OUTPATIENT
Start: 2023-02-14

## 2023-02-14 RX ADMIN — MEDROXYPROGESTERONE ACETATE 0 MG/ML: 150 INJECTION, SUSPENSION INTRAMUSCULAR at 00:00

## 2023-02-15 DIAGNOSIS — Z30.09 ENCOUNTER FOR OTHER GENERAL COUNSELING AND ADVICE ON CONTRACEPTION: ICD-10-CM

## 2023-02-15 DIAGNOSIS — O00.90 UNSPECIFIED ECTOPIC PREGNANCY WITHOUT INTRAUTERINE PREGNANCY: ICD-10-CM

## 2023-02-15 NOTE — DISCUSSION/SUMMARY
[FreeTextEntry1] : 31yo  who is s/p MTX on  for L ectopic pregnancy seen on TVUS on the same date. Her Day 4 and Day 7 beta-hCG with appropriate decrease. Her beta today is 25.7, and she is not complaining of continued heavy vaginal bleeding, and denies symptoms of anemia. \par Plan\par - Repeat beta hCG testing in 1 week\par - Counseled on importance of contraception given history of MTX on \par - Patient is considering Depo-Provera shot at next visit, if beta is <5U\par -Pt advise to make annual in April, last pap 2021\par \par Ward Snowden PGY1\par dw Dr. Balderrama\par

## 2023-02-15 NOTE — HISTORY OF PRESENT ILLNESS
[FreeTextEntry1] : 31yo  LMP  who is presenting for beta-hCG follow-up. Patient was seen in the ED at [facility] on  with abdominal pain and +preg test at home. At that time, she was diagnosed with PUL and told to follow-up in 48 hours for repeat beta and U/S testing. On , TVUS was concerning for L tubal ectopic pregnancy. She was treated with MTX on . Beta trending as follows:\par 705.9 ()->810 ()->MTX-> Day 4:671()->Day 7:444.4 ()-> 236 ()->25.7().\par \par Patient reports she stopped having vaginal bleeding last (), and she denies any lightheadedness or dizziness. Denies any chest pains, palpitations, or SOB. \par

## 2023-02-15 NOTE — HISTORY OF PRESENT ILLNESS
[FreeTextEntry1] :  Used, ID number unavailable. \par \par 31yo  LMP  who is presenting for beta-hCG follow-up. Patient was seen in the ED at [facility] on  with abdominal pain and +preg test at home. At that time, she was diagnosed with PUL and told to follow-up in 48 hours for repeat beta and U/S testing. On , TVUS was concerning for L tubal ectopic pregnancy. She was treated with MTX on . Beta trending as follows:\par 705.9 ()->810 ()->MTX-> Day 4:671()->Day 7:444.4 ()-> 236 ()->25.7()-><5 TODAY\par \par Today pt wishes to discuss plan for contraception. Desires Depo Provera for 6 months and then wishes to conceive again. Discussed concern for recurrent pregnancy loss, with 3 prior early miscarriages. Discussed plan for APLS work up at next visit. \par

## 2023-02-15 NOTE — PHYSICAL EXAM
[Appropriately responsive] : appropriately responsive [Oriented x3] : oriented x3 [Examination Of The Breasts] : a normal appearance [FreeTextEntry7] : 6/10 tenderness on deep palpation

## 2023-02-15 NOTE — DISCUSSION/SUMMARY
[FreeTextEntry1] : 33yo  who is s/p MTX on  for L ectopic pregnancy seen on TVUS on the same date. Her Day 4 and Day 7 beta-hCG with appropriate decrease. Her beta today is <5. No need for further follow up/ \par \par Plan\par - Depo Provera given today, pt wishes to wait 6 mo to conceive\par - Plan for visit in 3mo\par - Will obtain RPLS work up at that visit\par \par Dayne PGY4\par dw Dr. Villatoro

## 2023-02-16 DIAGNOSIS — O00.109 UNSPECIFIED TUBAL PREGNANCY WITHOUT INTRAUTERINE PREGNANCY: ICD-10-CM

## 2023-04-12 ENCOUNTER — APPOINTMENT (OUTPATIENT)
Dept: OBGYN | Facility: HOSPITAL | Age: 33
End: 2023-04-12

## 2023-05-16 ENCOUNTER — APPOINTMENT (OUTPATIENT)
Dept: OBGYN | Facility: HOSPITAL | Age: 33
End: 2023-05-16

## 2023-07-11 NOTE — ED PROVIDER NOTE - CONSTITUTIONAL, MLM
Impression: Primary open-angle glaucoma, bilateral, mild stage: H40.1131. Plan: IOP: 13/14 Target IOP: low teens TMAX IOP: 35/19 Patient denies Sulfa allergy, Patient denies heart/lung diagnosis Medications: Latanoprost QHS OU Patient denies family history of Glaucoma OCT 57/60 Patient education on condition and the potential of optic nerve damage. Reiterated importance of compliance with drops. Continue same drop regimen. Recommend 4-month IOP check with RNFL-OCT. normal... Well appearing, awake, alert, oriented to person, place, time/situation and in no apparent distress.

## 2023-07-27 NOTE — ED ADULT NURSE NOTE - CHPI ED NUR RELIEVING FX
performed. Current Status (7/27/2023): 0/10 reported on this date, check for consistency       Patient will be compliant with HEP for ROM/stretches/strengthening in order to improve performance in daily tasks. Status at IE: Pt reports performing gentle exercises such as digit opposition, active digit flexion, and tendon glides. Will initiate further HEP at future session in accordance to dx protocol. Current Status (7/27/2023): patient compliant with HEP, good movement patterns, progressing. 3. Patient will be compliant with implementing activity modification strategies into daily routine to improve performance in daily tasks. Status at IE: Not yet initiated      Long Term Goals: To be accomplished in 4-8 weeks   Patient will increase right wrist flexion to 50 degrees and extension to 45 degrees or greater to improve function for ADLs/IADLs. Status at IE:                     Active       Norms Right Left   Wrist Flex 0-80 40 55     Ext 0-70 33 60      2. Patient will increase right index, middle, ring, and small digit total active motion to 200 degrees or greater to improve grasp and manipulation of objects. Status at IE:   Active Index    Middle Ring Small    Total Active Motion   Right  162 165 162 160      3. Patient will increase right thumb MP flexion to 40 degrees and IP flexion to 60 degrees or greater to improve grasp and manipulation of objects. Status at IE:   Thumb     31  46 MP   47  71 IP      4. Patient will be able to complete the 9 hole peg test with the right hand in 25 seconds in order to improve ability to manipulate clothing fasteners and overall fine motor skills. Status at IE: Nine-Hole Peg Test:  Left= _24___seconds              Right=__31__seconds  Pt reports increased difficulty with clothing fastener manipulation and use of right hand fine motor skills.       5. Patient will be able to use the right UE to participate in functional clinic tasks for 15 minutes or greater none

## 2023-10-27 NOTE — ED ADULT NURSE NOTE - NSSEPSISSUSPECTED_ED_A_ED
Bandar Ruvalcaba  [address] [place] [address]   [place] [address]    October 27, 2023    Dear Bandar Ruvalcaba,    As you probably are aware, you have missed your scheduled appointment on 10/26/23 with Anastasiya Santiago MD. Your health and well-being are very important to us. In order to provide high quality care to you and other patients, it is very important that you contact the clinic when you are unable to keep a scheduled appointment at least 24 hours in advance per our Missed Appointment Policy.      Please keep in mind that when you make an appointment, we reserve this time for you. Therefore, we ask that you call our office as soon as you are aware you will be unable to keep a scheduled appointment; or at least 24 - 48 hours in advance.    It is our policy that if you fail to keep future appointments, you may be notified of dismissal from our clinical department. Please call us at [phone] if you would like to make another appointment. We look forward to meeting your healthcare needs.    Sincerely,           Anastasiya Santiago MD     No

## 2024-01-25 NOTE — ED PROVIDER NOTE - IV ALTEPLASE DOOR HIDDEN
Is This A New Presentation, Or A Follow-Up?: Nail Dystrophy Additional History: Reports she was on terbinafine in past with improvement but is concerned that her onychomycosis is returning show

## 2024-03-14 ENCOUNTER — LABORATORY RESULT (OUTPATIENT)
Age: 34
End: 2024-03-14

## 2024-03-14 ENCOUNTER — APPOINTMENT (OUTPATIENT)
Dept: OBGYN | Facility: CLINIC | Age: 34
End: 2024-03-14
Payer: MEDICAID

## 2024-03-14 ENCOUNTER — OUTPATIENT (OUTPATIENT)
Dept: OUTPATIENT SERVICES | Facility: HOSPITAL | Age: 34
LOS: 1 days | End: 2024-03-14
Payer: MEDICAID

## 2024-03-14 VITALS — DIASTOLIC BLOOD PRESSURE: 66 MMHG | SYSTOLIC BLOOD PRESSURE: 108 MMHG | WEIGHT: 151.6 LBS | BODY MASS INDEX: 31.68 KG/M2

## 2024-03-14 DIAGNOSIS — Z01.419 ENCOUNTER FOR GYNECOLOGICAL EXAMINATION (GENERAL) (ROUTINE) W/OUT ABNORMAL FINDINGS: ICD-10-CM

## 2024-03-14 DIAGNOSIS — N76.0 ACUTE VAGINITIS: ICD-10-CM

## 2024-03-14 DIAGNOSIS — O02.1 MISSED ABORTION: ICD-10-CM

## 2024-03-14 DIAGNOSIS — Z78.9 OTHER SPECIFIED HEALTH STATUS: ICD-10-CM

## 2024-03-14 DIAGNOSIS — Z87.59 PERSONAL HISTORY OF OTHER COMPLICATIONS OF PREGNANCY, CHILDBIRTH AND THE PUERPERIUM: ICD-10-CM

## 2024-03-14 PROCEDURE — T1013: CPT

## 2024-03-14 PROCEDURE — 87491 CHLMYD TRACH DNA AMP PROBE: CPT

## 2024-03-14 PROCEDURE — 99395 PREV VISIT EST AGE 18-39: CPT

## 2024-03-14 PROCEDURE — 87624 HPV HI-RISK TYP POOLED RSLT: CPT

## 2024-03-14 PROCEDURE — 87591 N.GONORRHOEAE DNA AMP PROB: CPT

## 2024-03-14 PROCEDURE — G0463: CPT

## 2024-03-14 RX ORDER — PRENATAL VIT NO.130/IRON/FOLIC 27MG-0.8MG
28-0.8 TABLET ORAL
Qty: 30 | Refills: 2 | Status: ACTIVE | COMMUNITY
Start: 2024-03-14 | End: 1900-01-01

## 2024-03-14 RX ORDER — FOLIC ACID 1 MG/1
1 TABLET ORAL
Qty: 30 | Refills: 5 | Status: ACTIVE | COMMUNITY
Start: 2024-03-14 | End: 1900-01-01

## 2024-03-14 NOTE — HISTORY OF PRESENT ILLNESS
[Normal Amount/Duration] : was of a normal amount and duration [Definite:  ___ (Date)] : the last menstrual period was [unfilled] [Sexually Active] : is sexually active [Regular Cycle Intervals] : periods have been regular [Spotting Between  Menses] : no spotting between menses

## 2024-03-14 NOTE — PHYSICAL EXAM
[Alert] : alert [Awake] : awake [Soft] : soft [Oriented x3] : oriented to person, place, and time [Normal] : uterus [No Bleeding] : there was no active vaginal bleeding [Uterine Adnexae] : were not tender and not enlarged [Pap Obtained] : a Pap smear was performed [Acute Distress] : no acute distress [LAD] : no lymphadenopathy [Thyroid Nodule] : no thyroid nodule [Mass] : no breast mass [Goiter] : no goiter [Nipple Discharge] : no nipple discharge [Tender] : non tender [Axillary LAD] : no axillary lymphadenopathy

## 2024-03-15 LAB
C TRACH RRNA SPEC QL NAA+PROBE: SIGNIFICANT CHANGE UP
HPV HIGH+LOW RISK DNA PNL CVX: SIGNIFICANT CHANGE UP
N GONORRHOEA RRNA SPEC QL NAA+PROBE: SIGNIFICANT CHANGE UP
SPECIMEN SOURCE: SIGNIFICANT CHANGE UP

## 2024-03-18 DIAGNOSIS — Z78.9 OTHER SPECIFIED HEALTH STATUS: ICD-10-CM

## 2024-03-18 DIAGNOSIS — O02.1 MISSED ABORTION: ICD-10-CM

## 2024-03-18 DIAGNOSIS — Z01.419 ENCOUNTER FOR GYNECOLOGICAL EXAMINATION (GENERAL) (ROUTINE) WITHOUT ABNORMAL FINDINGS: ICD-10-CM

## 2024-03-18 DIAGNOSIS — Z87.59 PERSONAL HISTORY OF OTHER COMPLICATIONS OF PREGNANCY, CHILDBIRTH AND THE PUERPERIUM: ICD-10-CM

## 2024-03-20 LAB — CYTOLOGY SPEC DOC CYTO: SIGNIFICANT CHANGE UP

## 2024-04-15 ENCOUNTER — APPOINTMENT (OUTPATIENT)
Dept: MATERNAL FETAL MEDICINE | Facility: CLINIC | Age: 34
End: 2024-04-15

## 2024-06-24 ENCOUNTER — APPOINTMENT (OUTPATIENT)
Dept: MATERNAL FETAL MEDICINE | Facility: CLINIC | Age: 34
End: 2024-06-24

## 2024-07-29 ENCOUNTER — APPOINTMENT (OUTPATIENT)
Dept: MATERNAL FETAL MEDICINE | Facility: CLINIC | Age: 34
End: 2024-07-29
Payer: MEDICAID

## 2024-07-29 ENCOUNTER — ASOB RESULT (OUTPATIENT)
Age: 34
End: 2024-07-29

## 2024-07-29 DIAGNOSIS — O36.80X0 PREGNANCY WITH INCONCLUSIVE FETAL VIABILITY, NOT APPLICABLE OR UNSPECIFIED: ICD-10-CM

## 2024-07-29 DIAGNOSIS — N96 RECURRENT PREGNANCY LOSS: ICD-10-CM

## 2024-07-29 PROCEDURE — 99204 OFFICE O/P NEW MOD 45 MIN: CPT | Mod: 95

## 2025-02-22 NOTE — ED ADULT NURSE NOTE - NS ED NURSE DC INFO COMPLEXITY
DIAGNOSIS: You have been diagnosed with chronic cholecystitis with cholelithiasis. This means you have inflammation of the gallbladder with gallstones.    WHAT WAS DONE: In the emergency department, we performed blood tests and imaging studies, including a CT scan and ultrasound, to check your gallbladder and pancreas. These tests helped us determine that your symptoms are due to gallstones and inflammation of the gallbladder.    WHAT TO EXPECT: You may continue to feel some nausea and dizziness. Following a low-fat diet can help manage your symptoms and prevent further gallbladder attacks.    TREATMENT AND CARE:   - Follow a low-fat diet. Avoid greasy or fried foods.  - Take the anti-inflammatory and antibiotic medications as prescribed.  - Rest and drink plenty of fluids to stay hydrated.    FOLLOW-UP:   - Call tomorrow to make an appointment with a surgeon for further management. Use the phone numbers provided to you.    WHEN TO RETURN TO THE EMERGENCY DEPARTMENT:   - If you experience worsening symptoms such as loss of consciousness, fever over 100.4 degrees Fahrenheit, severe vomiting, severe pain, or yellowish discoloration of your skin, return to the ER immediately.    --------------------------------------------------------------    DIAGNÓSTICO: Se le ha diagnosticado colecistitis crónica con colelitiasis. Casper significa que tiene inflamación de la vesícula biliar con cálculos biliares.    QUÉ SE HIZO: En el departamento de emergencias, realizamos análisis de danish y estudios de imagen, incluyendo miki tomografía computarizada y miki ecografía, para revisar palacios vesícula biliar y páncreas. Estas pruebas nos ayudaron a determinar que vijay síntomas se deben a cálculos biliares e inflamación de la vesícula biliar.    QUÉ ESPERAR: Puede continuar sintiendo algo de náuseas y mareos. Seguir miki dieta baja en grasas puede ayudar a controlar vijay síntomas y prevenir más ataques de la vesícula biliar.    TRATAMIENTO Y 
Simple: Patient demonstrates quick and easy understanding

## 2025-06-04 NOTE — ED ADULT TRIAGE NOTE - STATUS:
Please review; protocol failed/ has no protocol      Please see message below for upcoming appointment.    Future Appointments   Date Time Provider Department Center   6/16/2025 10:30 AM Ari Jhaveri MD [facility]     Message sent for patient to have labs done.      
[facility]